# Patient Record
Sex: FEMALE | HISPANIC OR LATINO | Employment: FULL TIME | URBAN - METROPOLITAN AREA
[De-identification: names, ages, dates, MRNs, and addresses within clinical notes are randomized per-mention and may not be internally consistent; named-entity substitution may affect disease eponyms.]

---

## 2017-12-20 ENCOUNTER — HOSPITAL ENCOUNTER (OUTPATIENT)
Facility: MEDICAL CENTER | Age: 27
End: 2017-12-20
Attending: SPECIALIST | Admitting: SPECIALIST
Payer: MEDICAID

## 2017-12-21 ENCOUNTER — HOSPITAL ENCOUNTER (INPATIENT)
Facility: MEDICAL CENTER | Age: 27
LOS: 2 days | End: 2017-12-23
Attending: SPECIALIST | Admitting: SPECIALIST
Payer: MEDICAID

## 2017-12-21 DIAGNOSIS — R10.2 PELVIC PAIN: ICD-10-CM

## 2017-12-21 PROBLEM — Z34.90 PREGNANCY: Status: ACTIVE | Noted: 2017-12-21

## 2017-12-21 LAB
BASOPHILS # BLD AUTO: 0.6 % (ref 0–1.8)
BASOPHILS # BLD: 0.06 K/UL (ref 0–0.12)
EOSINOPHIL # BLD AUTO: 0.03 K/UL (ref 0–0.51)
EOSINOPHIL NFR BLD: 0.3 % (ref 0–6.9)
ERYTHROCYTE [DISTWIDTH] IN BLOOD BY AUTOMATED COUNT: 42.3 FL (ref 35.9–50)
HCT VFR BLD AUTO: 36.4 % (ref 37–47)
HGB BLD-MCNC: 11.7 G/DL (ref 12–16)
HOLDING TUBE BB 8507: NORMAL
IMM GRANULOCYTES # BLD AUTO: 0.04 K/UL (ref 0–0.11)
IMM GRANULOCYTES NFR BLD AUTO: 0.4 % (ref 0–0.9)
LYMPHOCYTES # BLD AUTO: 2.74 K/UL (ref 1–4.8)
LYMPHOCYTES NFR BLD: 26.5 % (ref 22–41)
MCH RBC QN AUTO: 26.1 PG (ref 27–33)
MCHC RBC AUTO-ENTMCNC: 32.1 G/DL (ref 33.6–35)
MCV RBC AUTO: 81.1 FL (ref 81.4–97.8)
MONOCYTES # BLD AUTO: 0.59 K/UL (ref 0–0.85)
MONOCYTES NFR BLD AUTO: 5.7 % (ref 0–13.4)
NEUTROPHILS # BLD AUTO: 6.87 K/UL (ref 2–7.15)
NEUTROPHILS NFR BLD: 66.5 % (ref 44–72)
NRBC # BLD AUTO: 0 K/UL
NRBC BLD-RTO: 0 /100 WBC
PLATELET # BLD AUTO: 280 K/UL (ref 164–446)
PMV BLD AUTO: 12.7 FL (ref 9–12.9)
RBC # BLD AUTO: 4.49 M/UL (ref 4.2–5.4)
WBC # BLD AUTO: 10.3 K/UL (ref 4.8–10.8)

## 2017-12-21 PROCEDURE — 700111 HCHG RX REV CODE 636 W/ 250 OVERRIDE (IP): Performed by: SPECIALIST

## 2017-12-21 PROCEDURE — 700111 HCHG RX REV CODE 636 W/ 250 OVERRIDE (IP)

## 2017-12-21 PROCEDURE — 36415 COLL VENOUS BLD VENIPUNCTURE: CPT

## 2017-12-21 PROCEDURE — 700101 HCHG RX REV CODE 250

## 2017-12-21 PROCEDURE — 3E0E7GC INTRODUCTION OF OTHER THERAPEUTIC SUBSTANCE INTO PRODUCTS OF CONCEPTION, VIA NATURAL OR ARTIFICIAL OPENING: ICD-10-PCS | Performed by: SPECIALIST

## 2017-12-21 PROCEDURE — 770002 HCHG ROOM/CARE - OB PRIVATE (112)

## 2017-12-21 PROCEDURE — 85025 COMPLETE CBC W/AUTO DIFF WBC: CPT

## 2017-12-21 PROCEDURE — 10907ZC DRAINAGE OF AMNIOTIC FLUID, THERAPEUTIC FROM PRODUCTS OF CONCEPTION, VIA NATURAL OR ARTIFICIAL OPENING: ICD-10-PCS | Performed by: SPECIALIST

## 2017-12-21 PROCEDURE — 700105 HCHG RX REV CODE 258: Performed by: SPECIALIST

## 2017-12-21 PROCEDURE — 3E033VJ INTRODUCTION OF OTHER HORMONE INTO PERIPHERAL VEIN, PERCUTANEOUS APPROACH: ICD-10-PCS | Performed by: SPECIALIST

## 2017-12-21 PROCEDURE — 10H07YZ INSERTION OF OTHER DEVICE INTO PRODUCTS OF CONCEPTION, VIA NATURAL OR ARTIFICIAL OPENING: ICD-10-PCS | Performed by: SPECIALIST

## 2017-12-21 PROCEDURE — 303615 HCHG EPIDURAL/SPINAL ANESTHESIA FOR LABOR

## 2017-12-21 RX ORDER — SODIUM CHLORIDE, SODIUM LACTATE, POTASSIUM CHLORIDE, CALCIUM CHLORIDE 600; 310; 30; 20 MG/100ML; MG/100ML; MG/100ML; MG/100ML
INJECTION, SOLUTION INTRAVENOUS
Status: ACTIVE
Start: 2017-12-21 | End: 2017-12-22

## 2017-12-21 RX ORDER — METHYLERGONOVINE MALEATE 0.2 MG/ML
0.2 INJECTION INTRAVENOUS
Status: DISCONTINUED | OUTPATIENT
Start: 2017-12-21 | End: 2017-12-22 | Stop reason: HOSPADM

## 2017-12-21 RX ORDER — BUPIVACAINE HYDROCHLORIDE 2.5 MG/ML
INJECTION, SOLUTION EPIDURAL; INFILTRATION; INTRACAUDAL
Status: COMPLETED
Start: 2017-12-21 | End: 2017-12-21

## 2017-12-21 RX ORDER — ENEMA 19; 7 G/133ML; G/133ML
1 ENEMA RECTAL PRN
Status: DISCONTINUED | OUTPATIENT
Start: 2017-12-21 | End: 2017-12-22 | Stop reason: HOSPADM

## 2017-12-21 RX ORDER — OXYTOCIN 10 [USP'U]/ML
10 INJECTION, SOLUTION INTRAMUSCULAR; INTRAVENOUS
Status: DISCONTINUED | OUTPATIENT
Start: 2017-12-21 | End: 2017-12-22 | Stop reason: HOSPADM

## 2017-12-21 RX ORDER — SODIUM CHLORIDE, SODIUM LACTATE, POTASSIUM CHLORIDE, CALCIUM CHLORIDE 600; 310; 30; 20 MG/100ML; MG/100ML; MG/100ML; MG/100ML
INJECTION, SOLUTION INTRAVENOUS CONTINUOUS
Status: DISPENSED | OUTPATIENT
Start: 2017-12-21 | End: 2017-12-22

## 2017-12-21 RX ORDER — ROPIVACAINE HYDROCHLORIDE 2 MG/ML
INJECTION, SOLUTION EPIDURAL; INFILTRATION; PERINEURAL
Status: COMPLETED
Start: 2017-12-21 | End: 2017-12-21

## 2017-12-21 RX ADMIN — ROPIVACAINE HYDROCHLORIDE 100 ML: 2 INJECTION, SOLUTION EPIDURAL; INFILTRATION at 20:48

## 2017-12-21 RX ADMIN — BUPIVACAINE HYDROCHLORIDE: 2.5 INJECTION, SOLUTION EPIDURAL; INFILTRATION; INTRACAUDAL; PERINEURAL at 20:30

## 2017-12-21 RX ADMIN — SODIUM CHLORIDE, POTASSIUM CHLORIDE, SODIUM LACTATE AND CALCIUM CHLORIDE: 600; 310; 30; 20 INJECTION, SOLUTION INTRAVENOUS at 19:26

## 2017-12-21 RX ADMIN — FENTANYL CITRATE 100 MCG: 50 INJECTION INTRAMUSCULAR; INTRAVENOUS at 19:25

## 2017-12-21 RX ADMIN — FENTANYL CITRATE: 50 INJECTION, SOLUTION INTRAMUSCULAR; INTRAVENOUS at 20:30

## 2017-12-21 RX ADMIN — SODIUM CHLORIDE, POTASSIUM CHLORIDE, SODIUM LACTATE AND CALCIUM CHLORIDE: 600; 310; 30; 20 INJECTION, SOLUTION INTRAVENOUS at 17:50

## 2017-12-21 RX ADMIN — Medication 2 MILLI-UNITS/MIN: at 17:58

## 2017-12-21 ASSESSMENT — LIFESTYLE VARIABLES
DO YOU DRINK ALCOHOL: NO
EVER_SMOKED: NEVER
ALCOHOL_USE: NO

## 2017-12-21 NOTE — PROGRESS NOTES
27 y.o.  EDC=  39.5     TOCO and US on.  VSS.  Pt presents to triage with c/o UCs.  Denies VB or LOF and states pos FM, but a little less.  SVE -3/50/-2.  1600-Dr. Diego here, in room to see pt.  POC-IOL, will transfer to L&D.  1700-Pt care assumed by Alondra ARREGUIN, report given.

## 2017-12-22 LAB
ERYTHROCYTE [DISTWIDTH] IN BLOOD BY AUTOMATED COUNT: 43 FL (ref 35.9–50)
HCT VFR BLD AUTO: 29.3 % (ref 37–47)
HGB BLD-MCNC: 9.4 G/DL (ref 12–16)
MCH RBC QN AUTO: 26.3 PG (ref 27–33)
MCHC RBC AUTO-ENTMCNC: 32.1 G/DL (ref 33.6–35)
MCV RBC AUTO: 81.8 FL (ref 81.4–97.8)
PLATELET # BLD AUTO: 195 K/UL (ref 164–446)
PMV BLD AUTO: 12.1 FL (ref 9–12.9)
RBC # BLD AUTO: 3.58 M/UL (ref 4.2–5.4)
WBC # BLD AUTO: 10.4 K/UL (ref 4.8–10.8)

## 2017-12-22 PROCEDURE — 90471 IMMUNIZATION ADMIN: CPT

## 2017-12-22 PROCEDURE — 3E0234Z INTRODUCTION OF SERUM, TOXOID AND VACCINE INTO MUSCLE, PERCUTANEOUS APPROACH: ICD-10-PCS | Performed by: SPECIALIST

## 2017-12-22 PROCEDURE — 90686 IIV4 VACC NO PRSV 0.5 ML IM: CPT | Performed by: SPECIALIST

## 2017-12-22 PROCEDURE — 700105 HCHG RX REV CODE 258: Performed by: SPECIALIST

## 2017-12-22 PROCEDURE — 770002 HCHG ROOM/CARE - OB PRIVATE (112)

## 2017-12-22 PROCEDURE — 85027 COMPLETE CBC AUTOMATED: CPT

## 2017-12-22 PROCEDURE — 700111 HCHG RX REV CODE 636 W/ 250 OVERRIDE (IP)

## 2017-12-22 PROCEDURE — 700102 HCHG RX REV CODE 250 W/ 637 OVERRIDE(OP): Performed by: SPECIALIST

## 2017-12-22 PROCEDURE — A9270 NON-COVERED ITEM OR SERVICE: HCPCS | Performed by: SPECIALIST

## 2017-12-22 PROCEDURE — 36415 COLL VENOUS BLD VENIPUNCTURE: CPT

## 2017-12-22 PROCEDURE — 700112 HCHG RX REV CODE 229: Performed by: SPECIALIST

## 2017-12-22 PROCEDURE — 304965 HCHG RECOVERY SERVICES

## 2017-12-22 PROCEDURE — 700111 HCHG RX REV CODE 636 W/ 250 OVERRIDE (IP): Performed by: SPECIALIST

## 2017-12-22 PROCEDURE — 90707 MMR VACCINE SC: CPT

## 2017-12-22 PROCEDURE — 59409 OBSTETRICAL CARE: CPT

## 2017-12-22 RX ORDER — IBUPROFEN 600 MG/1
600 TABLET ORAL EVERY 6 HOURS PRN
Status: DISCONTINUED | OUTPATIENT
Start: 2017-12-22 | End: 2017-12-23 | Stop reason: HOSPADM

## 2017-12-22 RX ORDER — HYDROCODONE BITARTRATE AND ACETAMINOPHEN 10; 325 MG/1; MG/1
1 TABLET ORAL EVERY 4 HOURS PRN
Status: DISCONTINUED | OUTPATIENT
Start: 2017-12-22 | End: 2017-12-23 | Stop reason: HOSPADM

## 2017-12-22 RX ORDER — ONDANSETRON 2 MG/ML
4 INJECTION INTRAMUSCULAR; INTRAVENOUS EVERY 6 HOURS PRN
Status: DISCONTINUED | OUTPATIENT
Start: 2017-12-22 | End: 2017-12-23 | Stop reason: HOSPADM

## 2017-12-22 RX ORDER — METHYLERGONOVINE MALEATE 0.2 MG/ML
0.2 INJECTION INTRAVENOUS
Status: DISCONTINUED | OUTPATIENT
Start: 2017-12-22 | End: 2017-12-23 | Stop reason: HOSPADM

## 2017-12-22 RX ORDER — HYDROCODONE BITARTRATE AND ACETAMINOPHEN 5; 325 MG/1; MG/1
1 TABLET ORAL EVERY 4 HOURS PRN
Status: DISCONTINUED | OUTPATIENT
Start: 2017-12-22 | End: 2017-12-23 | Stop reason: HOSPADM

## 2017-12-22 RX ORDER — SODIUM CHLORIDE, SODIUM LACTATE, POTASSIUM CHLORIDE, CALCIUM CHLORIDE 600; 310; 30; 20 MG/100ML; MG/100ML; MG/100ML; MG/100ML
INJECTION, SOLUTION INTRAVENOUS PRN
Status: DISCONTINUED | OUTPATIENT
Start: 2017-12-22 | End: 2017-12-23 | Stop reason: HOSPADM

## 2017-12-22 RX ORDER — ACETAMINOPHEN 325 MG/1
325 TABLET ORAL EVERY 4 HOURS PRN
Status: DISCONTINUED | OUTPATIENT
Start: 2017-12-22 | End: 2017-12-23 | Stop reason: HOSPADM

## 2017-12-22 RX ORDER — ONDANSETRON 4 MG/1
4 TABLET, ORALLY DISINTEGRATING ORAL EVERY 6 HOURS PRN
Status: DISCONTINUED | OUTPATIENT
Start: 2017-12-22 | End: 2017-12-23 | Stop reason: HOSPADM

## 2017-12-22 RX ORDER — DOCUSATE SODIUM 100 MG/1
100 CAPSULE, LIQUID FILLED ORAL 2 TIMES DAILY PRN
Status: DISCONTINUED | OUTPATIENT
Start: 2017-12-22 | End: 2017-12-23 | Stop reason: HOSPADM

## 2017-12-22 RX ADMIN — IBUPROFEN 600 MG: 600 TABLET, FILM COATED ORAL at 05:33

## 2017-12-22 RX ADMIN — DOCUSATE SODIUM 100 MG: 100 CAPSULE ORAL at 09:21

## 2017-12-22 RX ADMIN — INFLUENZA A VIRUS A/MICHIGAN/45/2015 X-275 (H1N1) ANTIGEN (FORMALDEHYDE INACTIVATED), INFLUENZA A VIRUS A/HONG KONG/4801/2014 X-263B (H3N2) ANTIGEN (FORMALDEHYDE INACTIVATED), INFLUENZA B VIRUS B/PHUKET/3073/2013 ANTIGEN (FORMALDEHYDE INACTIVATED), AND INFLUENZA B VIRUS B/BRISBANE/60/2008 ANTIGEN (FORMALDEHYDE INACTIVATED) 0.5 ML: 15; 15; 15; 15 INJECTION, SUSPENSION INTRAMUSCULAR at 14:33

## 2017-12-22 RX ADMIN — IBUPROFEN 600 MG: 600 TABLET, FILM COATED ORAL at 13:53

## 2017-12-22 RX ADMIN — HYDROCODONE BITARTRATE AND ACETAMINOPHEN 1 TABLET: 10; 325 TABLET ORAL at 13:53

## 2017-12-22 RX ADMIN — HYDROCODONE BITARTRATE AND ACETAMINOPHEN 1 TABLET: 10; 325 TABLET ORAL at 05:33

## 2017-12-22 RX ADMIN — DOCUSATE SODIUM 100 MG: 100 CAPSULE ORAL at 20:25

## 2017-12-22 RX ADMIN — Medication 125 ML/HR: at 03:45

## 2017-12-22 RX ADMIN — MEASLES, MUMPS, AND RUBELLA VIRUS VACCINE LIVE 0.5 ML: 1000; 12500; 1000 INJECTION, POWDER, LYOPHILIZED, FOR SUSPENSION SUBCUTANEOUS at 15:35

## 2017-12-22 RX ADMIN — Medication 41.67 MILLI-UNITS/MIN: at 01:08

## 2017-12-22 RX ADMIN — IBUPROFEN 600 MG: 600 TABLET, FILM COATED ORAL at 20:25

## 2017-12-22 RX ADMIN — HYDROCODONE BITARTRATE AND ACETAMINOPHEN 1 TABLET: 10; 325 TABLET ORAL at 20:27

## 2017-12-22 RX ADMIN — SODIUM CHLORIDE, POTASSIUM CHLORIDE, SODIUM LACTATE AND CALCIUM CHLORIDE: 600; 310; 30; 20 INJECTION, SOLUTION INTRAVENOUS at 01:09

## 2017-12-22 ASSESSMENT — PAIN SCALES - GENERAL
PAINLEVEL_OUTOF10: 2
PAINLEVEL_OUTOF10: 9
PAINLEVEL_OUTOF10: 0
PAINLEVEL_OUTOF10: 3
PAINLEVEL_OUTOF10: 3
PAINLEVEL_OUTOF10: 8
PAINLEVEL_OUTOF10: 0
PAINLEVEL_OUTOF10: 9

## 2017-12-22 ASSESSMENT — LIFESTYLE VARIABLES: DO YOU DRINK ALCOHOL: NO

## 2017-12-22 NOTE — CARE PLAN
Problem: Potential for postpartum infection related to presence of episiotomy/vaginal tear and/or uterine contamination  Goal: Patient will be absent from signs and symptoms of infection  Outcome: PROGRESSING AS EXPECTED  Encouraged ambulation.     Problem: Alteration in comfort related to episiotomy, vaginal repair and/or after birth pains  Goal: Patient is able to ambulate, care for self and infant  Outcome: PROGRESSING AS EXPECTED  Pain medication will be given as needed. She prefer to call.

## 2017-12-22 NOTE — DELIVERY NOTE
DATE OF SERVICE:  2017    The patient is a very pleasant 27-year-old multipara (on admission, para 4   with 4 previous vaginal deliveries) who was admitted to [facility] labor and delivery in the afternoon/evening of Thursday,   2017 and she was admitted at 39 weeks and 5 days' gestation and she was   admitted for elective induction of labor.  Her recent vaginal culture for   group B strep came back negative for group B strep.  On admission, her cervix   was found to be about 3 cm dilated and 50% effaced and -2 station.  She was   started on Pitocin.  The Pitocin was gradually increased.  Of note, the   estimated fetal weight was 3200 g.  She later received a labor epidural.  At   about 09:00 p.m. it was noted that her labor epidural was functioning well and   digital cervical exam at that time revealed that the cervix was 3-4 cm   dilated, 70% effaced and -2 station and at that time artificial rupture of   membranes was performed and clear amniotic fluid was observed and an   intrauterine pressure catheter was placed and the fetal heart tracing was   found to be category 1.  Some evidence of a fetal arrhythmia was appreciated.    Then, little bit after 10:00 fetal heart rate deceleration down to the 90s   was noted and so Pitocin was discontinued and amnioinfusion was started.  At   about 20 minutes after 11:00, the patient's cervix was found to be about 8 cm   dilated, 80% effaced, and -1 station.  A few minutes after midnight, now   2017, the patient said that she was feeling some pressure and   the nurse removed the peanut ball and found that baby's head was actually   delivered and I was called.  At 12 minutes after midnight, 2017,   the patient went on to have a normal spontaneous vaginal delivery of a live   term male  with Apgar scores of 8 and 9 at 1 and 5 minutes respectively   and a  weight of 3290 grams.  Baby was delivered over  an intact perineum   under continuous epidural anesthesia.  The placenta was simply delivered and   examined and found to be complete.  Examination of the vulva and vagina   revealed that there were no vulvar or vaginal lacerations.  A bimanual vaginal   exam was performed and revealed that the uterus was firm.  Bleeding appeared   to be minimal.    ESTIMATED BLOOD LOSS:  Approximately 200 mL.       ____________________________________     MD KALEE PATRICK / NTS    DD:  12/22/2017 07:41:10  DT:  12/22/2017 07:55:27    D#:  [id number]  Job#:  [id number]

## 2017-12-22 NOTE — PROGRESS NOTES
Kirstie Nava R.N. Lactation Consultant Signed   Progress Notes Date of Service: 12/22/2017  3:31 PM         []Hide copied text  []Ramanaver for attribution information  Met with mother, 5th baby. She nursed her previous baby for 2 years. Her plan is to breastfeed first then bottle feed. She feels that feeds and latch are going well. Offered her ongoing lactation help while in the hospital.

## 2017-12-22 NOTE — PROGRESS NOTES
Eneida is a very pleasant 27-year-old multipara R (para 4, and Eneida has had 4 previous vaginal deliveries) who is today 39 weeks and 5 days gestation. She is admitted today for induction of labor. Her recent vaginal culture for group B strep came back negative for group B strep. Her cervix on admission is 3 cm dilated and 50% effaced and -2 station. She is being started on Pitocin. The Pitocin will be gradually increased. We will give analgesia as needed. We will anticipate an obstetrical delivery. The estimated fetal weight is 3200 grams.  Anthony Diego M.D.

## 2017-12-22 NOTE — PROGRESS NOTES
The patient received a labor epidural and her labor epidural appears to be effectively relieving her pain.  Digital cervical exam reveals that the cervix is now 3-4 cm dilated and 70% effaced and -2 station and artificial rupture membranes is performed and not much fluid is observed but there is perhaps some light meconium staining of the fluid. An IUPC is then placed.  The fetal heart tracing is category 1.  We will anticipate an obstetrical delivery.  Anthony Diego M.D.

## 2017-12-22 NOTE — PROGRESS NOTES
1700- Patient transferred to labor room. Admission orders received and completed.     1758- Pitocin started per MAR.  Patient requesting epidural but anesthesia in OR. Patient educated on option of fentanyl but declines at this time.     1900- Report to noc shift RN. POC discussed.

## 2017-12-22 NOTE — CARE PLAN
Problem: Potential for postpartum infection related to presence of episiotomy/vaginal tear and/or uterine contamination  Goal: Patient will be absent from signs and symptoms of infection  Outcome: PROGRESSING AS EXPECTED  Encouraged ambulation.     Problem: Alteration in comfort related to episiotomy, vaginal repair and/or after birth pains  Goal: Patient is able to ambulate, care for self and infant  Outcome: PROGRESSING AS EXPECTED  Will medicate for pain as needed. She prefer to call if needed.

## 2017-12-22 NOTE — PROGRESS NOTES
A bedside report received from Shira ARREGUIN @ the change of shift.  Assumed care. Discussed plan of care especially on managing pain. Assessment done. Denies pain. Encouraged to call if with needs. Will check at intervals.

## 2017-12-22 NOTE — PROGRESS NOTES
-rcvd report from segundo RN and assumed care of pt.  -SVE=3/70/-2  -pt requesting epidural at this time. Fluid bolus infusing.  -Dr. Gilliland at bedside to place epidural. Placed without incidence.  -Dr. iDego at bedside to rupture membranes. AROM with light meconium noted. SVE-4/80/-1 Pt now having late decels.  -pt having prolonged decels x4 minutes down into 90's. Pit off, o2 applied and fluid bolus infusing. Dr. Diego called and updated on pt. Orders rcvd for amnioinfusion.  -SVE=8/-1  0011=pt called out to RN feeling pressure. RN immediately to room. Peanut ball removed and babies head was out and baby was crying. Dr. Diego called for delivery.   0012- of baby boy with 8/9 apgars born at 0012.   0014-placenta delivery without difficulty. No lacerations.  0245-pt up to bathroom to void but unable to at this time. Right leg still slightly numb. Barbie area cleaned and new gown applied. Pt wheeled to  via wheelchair in stable condition. Baby taken to nursery earlier for desats. Report given to Nelsy ARREGUIN

## 2017-12-22 NOTE — PROGRESS NOTES
Patient arrived to room 320 at 0315. Report received from KALLIE Goodman. Educated patient on cuddles system, infant identification, emergency pull cord, and [facility] health education videos.

## 2017-12-22 NOTE — DELIVERY NOTE
Normal spontaneous vaginal delivery.  Live term male .  Apgar scores 8 and 9 at one and 5 minutes respectively.  Weight not yet recorded but estimated to be approximately 3 kg or perhaps 3-3.5 kg.  Baby was delivered over an intact perineum under continuous epidural anesthesia.  The placenta was then simply delivered and examined and found to be complete.  Next examination of the vulva and vagina revealed that there were no vulvar or vaginal lacerations.  A bimanual vaginal exam was then performed and revealed that the uterus was firm.  Bleeding appeared to be minimal.  The estimated blood loss was approximately 200 mL.  Anthony Diego M.D.

## 2017-12-23 VITALS
DIASTOLIC BLOOD PRESSURE: 86 MMHG | WEIGHT: 246 LBS | HEART RATE: 63 BPM | RESPIRATION RATE: 18 BRPM | HEIGHT: 66 IN | OXYGEN SATURATION: 94 % | SYSTOLIC BLOOD PRESSURE: 132 MMHG | BODY MASS INDEX: 39.53 KG/M2 | TEMPERATURE: 98 F

## 2017-12-23 PROCEDURE — 700102 HCHG RX REV CODE 250 W/ 637 OVERRIDE(OP): Performed by: SPECIALIST

## 2017-12-23 PROCEDURE — A9270 NON-COVERED ITEM OR SERVICE: HCPCS | Performed by: SPECIALIST

## 2017-12-23 RX ORDER — FERROUS SULFATE 325(65) MG
325 TABLET ORAL
Status: DISCONTINUED | OUTPATIENT
Start: 2017-12-23 | End: 2017-12-23 | Stop reason: HOSPADM

## 2017-12-23 RX ORDER — IBUPROFEN 800 MG/1
800 TABLET ORAL EVERY 8 HOURS PRN
Qty: 30 TAB | Refills: 3 | Status: SHIPPED | OUTPATIENT
Start: 2017-12-23

## 2017-12-23 RX ORDER — PSEUDOEPHEDRINE HCL 30 MG
100 TABLET ORAL 2 TIMES DAILY
Qty: 30 CAP | Refills: 0 | Status: SHIPPED | OUTPATIENT
Start: 2017-12-23 | End: 2023-06-02

## 2017-12-23 RX ORDER — HYDROCODONE BITARTRATE AND ACETAMINOPHEN 5; 325 MG/1; MG/1
1 TABLET ORAL EVERY 6 HOURS PRN
Qty: 28 TAB | Refills: 0 | Status: SHIPPED | OUTPATIENT
Start: 2017-12-23 | End: 2018-01-23

## 2017-12-23 RX ORDER — FERROUS SULFATE 325(65) MG
325 TABLET ORAL
Qty: 30 TAB | Refills: 0 | Status: SHIPPED | OUTPATIENT
Start: 2017-12-23 | End: 2023-06-02

## 2017-12-23 RX ADMIN — IBUPROFEN 600 MG: 600 TABLET, FILM COATED ORAL at 05:16

## 2017-12-23 RX ADMIN — HYDROCODONE BITARTRATE AND ACETAMINOPHEN 1 TABLET: 10; 325 TABLET ORAL at 05:16

## 2017-12-23 ASSESSMENT — PAIN SCALES - GENERAL
PAINLEVEL_OUTOF10: 3
PAINLEVEL_OUTOF10: 8
PAINLEVEL_OUTOF10: 4

## 2017-12-23 NOTE — DISCHARGE INSTRUCTIONS
POSTPARTUM DISCHARGE INSTRUCTIONS FOR MOM    YOB: 1990   Age: 27 y.o.               Admit Date: 2017     Discharge Date: 2017  Attending Doctor:  Anthony Diego M.D.                  Allergies:  Oxycodone    Discharged to home by car. Discharged via wheelchair, hospital escort: Yes.  Special equipment needed: Not Applicable  Belongings with: Personal  Be sure to schedule a follow-up appointment with your primary care doctor or any specialists as instructed.     Discharge Plan:   Influenza Vaccine Indication: Indicated: 9 to 64 years of age  Influenza Vaccine Given - only chart on this line when given: Influenza Vaccine Given (See MAR)    REASONS TO CALL YOUR OBSTETRICIAN:  1.   Persistent fever or shaking chills (Temperature higher than 100.4)  2.   Heavy bleeding (soaking more than 1 pad per hour); Passing clots  3.   Foul odor from vagina  4.   Mastitis (Breast infection; breast pain, chills, fever, redness)  5.   Urinary pain, burning or frequency  6.   Episiotomy infection  7.   Abdominal incision infection  8.   Severe depression longer than 24 hours    HAND WASHING  · Prior to handling the baby.  · Before breastfeeding or bottle feeding baby.  · After using the bathroom or changing the baby's diaper.    WOUND CARE  Ask your physician for additional care instructions.  In general:    ·  Incision:      · Keep clean and dry.    · Do NOT lift anything heavier than your baby for up to 6 weeks.    · There should not be any opening or pus.      VAGINAL CARE  · Nothing inside vagina for 6 weeks: no sexual intercourse, tampons or douching.  · Bleeding may continue for 2-4 weeks.  Amount may vary.    · Call your physician for heavy bleeding which means soaking more than 1 pad per hour    BIRTH CONTROL  · It is possible to become pregnant at any time after delivery and while breastfeeding.  · Plan to discuss a method of birth control with your physician at your follow up visit.  "visit.    DIET AND ELIMINATION  · Eating more fiber (bran cereal, fruits, and vegetables) and drinking plenty of fluids will help to avoid constipation.  · Urinary frequency after childbirth is normal.    POSTPARTUM BLUES  During the first few days after birth, you may experience a sense of the \"blues\" which may include impatience, irritability or even crying.  These feeling come and go quickly.  However, as many as 1 in 10 women experience emotional symptoms known as postpartum depression.    Postpartum depression:  May start as early as the second or third day after delivery or take several weeks or months to develop.  Symptoms of \"blues\" are present, but are more intense:  Crying spells; loss of appetite; feelings of hopelessness or loss of control; fear of touching the baby; over concern or no concern at all about the baby; little or no concern about your own appearance/caring for yourself; and/or inability to sleep or excessive sleeping.  Contact your physician if you are experiencing any of these symptoms.    Crisis Hotline:  · [place] Crisis Hotline:  1-444-BIMLHWJ  Or 1-228.665.9265  · [place] Crisis Hotline:  1-196.350.2054  Or 230-258-8887    PREVENTING SHAKEN BABY:  If you are angry or stressed, PUT THE BABY IN THE CRIB, step away, take some deep breaths, and wait until you are calm to care for the baby.  DO NOT SHAKE THE BABY.  You are not alone, call a supporter for help.    · Crisis Call Center 24/7 crisis line 133-892-0957 or 1-398.387.4840  · You can also text them, text \"ANSWER\" to 630748    QUIT SMOKING/TOBACCO USE:  I understand the use of any tobacco products increases my chance of suffering from future heart disease and could cause other illnesses which may shorten my life. Quitting the use of tobacco products is the single most important thing I can do to improve my health. For further information on smoking / tobacco cessation call a Toll Free Quit Line at 1-605.707.6327 (*National Cancer " [place]) or 1-585.735.1161 (American Lung Association) or you can access the web based program at www.lungusa.org.    · [place] Tobacco Users Help Line:  (948) 241-3541       Toll Free: 1-634.344.9874  · Quit Tobacco Program [facility] (355)208-4868    DEPRESSION / SUICIDE RISK:  As you are discharged from this [facility], it is important to learn how to keep safe from harming yourself.    Recognize the warning signs:  · Abrupt changes in personality, positive or negative- including increase in energy   · Giving away possessions  · Change in eating patterns- significant weight changes-  positive or negative  · Change in sleeping patterns- unable to sleep or sleeping all the time   · Unwillingness or inability to communicate  · Depression  · Unusual sadness, discouragement and loneliness  · Talk of wanting to die  · Neglect of personal appearance   · Rebelliousness- reckless behavior  · Withdrawal from people/activities they love  · Confusion- inability to concentrate     If you or a loved one observes any of these behaviors or has concerns about self-harm, here's what you can do:  · Talk about it- your feelings and reasons for harming yourself  · Remove any means that you might use to hurt yourself (examples: pills, rope, extension cords, firearm)  · Get professional help from the community (Mental Health, Substance Abuse, psychological counseling)  · Do not be alone:Call your Safe Contact- someone whom you trust who will be there for you.  · Call your local CRISIS HOTLINE 023-3503 or 288-682-2668  · Call your local Children's Mobile Crisis Response Team [place] (108) 686-8619 or www.[employer]  · Call the toll free National Suicide Prevention Hotlines   · National Suicide Prevention Lifeline 782-619-OILW (8073)  · National Hope Line Network 800-SUICIDE (273-6932)    DISCHARGE SURVEY:  Thank you for choosing [facility].  We hope we provided you with very good care.   You may be receiving a survey in the mail.  Please fill it out.  Your opinion is valuable to us.    ADDITIONAL EDUCATIONAL MATERIALS GIVEN TO PATIENT:        My signature on this form indicates that:  1.  I have reviewed and understand the above information  2.  My questions regarding this information have been answered to my satisfaction.  3.  I have formulated a plan with my discharge nurse to obtain my prescribed medication for home.

## 2017-12-23 NOTE — PROGRESS NOTES
Received report from ROXANE Quinteros RN. Assessment complete. Pt states pain at acceptable level. Discussed pain management plan with pt. Pt will call for PRN pain meds. Pt educated on the dangers of sleeping with infant. Call light within reach. Pt encouraged to call with needs or concerns.

## 2017-12-23 NOTE — PROGRESS NOTES
POST PARTUM DAY # 1  The patient complains of cramping pain.   She says that she feels fine other wise and she says that she has no other problems or complaints.   She says that she would like to go home today.   The patient's vital signs are stable and she is afebrile.   She appears well developed and well nourished and relaxed and alert and comfortable and in no apparent distress.   Labs: the patient's hemoglobin went from 11.7 grams per deciliter antepartum to 9.4 grams per deciliter post partum.   We will plan on discharge home today.   Follow up in office in about 6 weeks for post partum visit.   Anthony Diego M.D.

## 2018-06-07 ENCOUNTER — HOSPITAL ENCOUNTER (EMERGENCY)
Facility: MEDICAL CENTER | Age: 28
End: 2018-06-08
Attending: EMERGENCY MEDICINE
Payer: MEDICAID

## 2018-06-07 ENCOUNTER — APPOINTMENT (OUTPATIENT)
Dept: RADIOLOGY | Facility: MEDICAL CENTER | Age: 28
End: 2018-06-07
Attending: EMERGENCY MEDICINE
Payer: MEDICAID

## 2018-06-07 DIAGNOSIS — R10.11 RUQ ABDOMINAL PAIN: ICD-10-CM

## 2018-06-07 DIAGNOSIS — R11.0 NAUSEA: ICD-10-CM

## 2018-06-07 LAB
ALBUMIN SERPL BCP-MCNC: 4 G/DL (ref 3.2–4.9)
ALBUMIN/GLOB SERPL: 1.3 G/DL
ALP SERPL-CCNC: 54 U/L (ref 30–99)
ALT SERPL-CCNC: 18 U/L (ref 2–50)
ANION GAP SERPL CALC-SCNC: 7 MMOL/L (ref 0–11.9)
APPEARANCE UR: CLEAR
AST SERPL-CCNC: 16 U/L (ref 12–45)
BASOPHILS # BLD AUTO: 0.8 % (ref 0–1.8)
BASOPHILS # BLD: 0.08 K/UL (ref 0–0.12)
BILIRUB SERPL-MCNC: 0.4 MG/DL (ref 0.1–1.5)
BILIRUB UR QL STRIP.AUTO: NEGATIVE
BUN SERPL-MCNC: 7 MG/DL (ref 8–22)
CALCIUM SERPL-MCNC: 9.1 MG/DL (ref 8.4–10.2)
CHLORIDE SERPL-SCNC: 106 MMOL/L (ref 96–112)
CO2 SERPL-SCNC: 24 MMOL/L (ref 20–33)
COLOR UR: YELLOW
CREAT SERPL-MCNC: 0.58 MG/DL (ref 0.5–1.4)
EOSINOPHIL # BLD AUTO: 0.21 K/UL (ref 0–0.51)
EOSINOPHIL NFR BLD: 2.1 % (ref 0–6.9)
EPI CELLS #/AREA URNS HPF: NORMAL /HPF
ERYTHROCYTE [DISTWIDTH] IN BLOOD BY AUTOMATED COUNT: 42.5 FL (ref 35.9–50)
GLOBULIN SER CALC-MCNC: 3.2 G/DL (ref 1.9–3.5)
GLUCOSE SERPL-MCNC: 94 MG/DL (ref 65–99)
GLUCOSE UR STRIP.AUTO-MCNC: NEGATIVE MG/DL
HCG UR QL: NEGATIVE
HCT VFR BLD AUTO: 40.1 % (ref 37–47)
HGB BLD-MCNC: 12.4 G/DL (ref 12–16)
IMM GRANULOCYTES # BLD AUTO: 0.02 K/UL (ref 0–0.11)
IMM GRANULOCYTES NFR BLD AUTO: 0.2 % (ref 0–0.9)
KETONES UR STRIP.AUTO-MCNC: 15 MG/DL
LEUKOCYTE ESTERASE UR QL STRIP.AUTO: NEGATIVE
LIPASE SERPL-CCNC: 23 U/L (ref 7–58)
LYMPHOCYTES # BLD AUTO: 3.15 K/UL (ref 1–4.8)
LYMPHOCYTES NFR BLD: 31.2 % (ref 22–41)
MCH RBC QN AUTO: 24.8 PG (ref 27–33)
MCHC RBC AUTO-ENTMCNC: 30.9 G/DL (ref 33.6–35)
MCV RBC AUTO: 80.4 FL (ref 81.4–97.8)
MICRO URNS: ABNORMAL
MONOCYTES # BLD AUTO: 0.55 K/UL (ref 0–0.85)
MONOCYTES NFR BLD AUTO: 5.4 % (ref 0–13.4)
MUCOUS THREADS #/AREA URNS HPF: NORMAL /HPF
NEUTROPHILS # BLD AUTO: 6.09 K/UL (ref 2–7.15)
NEUTROPHILS NFR BLD: 60.3 % (ref 44–72)
NITRITE UR QL STRIP.AUTO: NEGATIVE
NRBC # BLD AUTO: 0 K/UL
NRBC BLD-RTO: 0 /100 WBC
PH UR STRIP.AUTO: 5 [PH]
PLATELET # BLD AUTO: 409 K/UL (ref 164–446)
PMV BLD AUTO: 10.4 FL (ref 9–12.9)
POTASSIUM SERPL-SCNC: 3.5 MMOL/L (ref 3.6–5.5)
PROT SERPL-MCNC: 7.2 G/DL (ref 6–8.2)
PROT UR QL STRIP: NEGATIVE MG/DL
RBC # BLD AUTO: 4.99 M/UL (ref 4.2–5.4)
RBC # URNS HPF: NORMAL /HPF
RBC UR QL AUTO: ABNORMAL
SODIUM SERPL-SCNC: 137 MMOL/L (ref 135–145)
SP GR UR REFRACTOMETRY: 1.02
WBC # BLD AUTO: 10.1 K/UL (ref 4.8–10.8)
WBC #/AREA URNS HPF: NORMAL /HPF

## 2018-06-07 PROCEDURE — 80053 COMPREHEN METABOLIC PANEL: CPT

## 2018-06-07 PROCEDURE — A9270 NON-COVERED ITEM OR SERVICE: HCPCS | Performed by: EMERGENCY MEDICINE

## 2018-06-07 PROCEDURE — 96375 TX/PRO/DX INJ NEW DRUG ADDON: CPT

## 2018-06-07 PROCEDURE — 700111 HCHG RX REV CODE 636 W/ 250 OVERRIDE (IP): Performed by: EMERGENCY MEDICINE

## 2018-06-07 PROCEDURE — 83690 ASSAY OF LIPASE: CPT

## 2018-06-07 PROCEDURE — 700102 HCHG RX REV CODE 250 W/ 637 OVERRIDE(OP): Performed by: EMERGENCY MEDICINE

## 2018-06-07 PROCEDURE — 36415 COLL VENOUS BLD VENIPUNCTURE: CPT

## 2018-06-07 PROCEDURE — 85025 COMPLETE CBC W/AUTO DIFF WBC: CPT

## 2018-06-07 PROCEDURE — 81001 URINALYSIS AUTO W/SCOPE: CPT

## 2018-06-07 PROCEDURE — 96374 THER/PROPH/DIAG INJ IV PUSH: CPT

## 2018-06-07 PROCEDURE — 99285 EMERGENCY DEPT VISIT HI MDM: CPT

## 2018-06-07 PROCEDURE — 81025 URINE PREGNANCY TEST: CPT

## 2018-06-07 PROCEDURE — 76705 ECHO EXAM OF ABDOMEN: CPT

## 2018-06-07 RX ORDER — METOCLOPRAMIDE HYDROCHLORIDE 5 MG/ML
10 INJECTION INTRAMUSCULAR; INTRAVENOUS ONCE
Status: COMPLETED | OUTPATIENT
Start: 2018-06-07 | End: 2018-06-07

## 2018-06-07 RX ORDER — KETOROLAC TROMETHAMINE 30 MG/ML
15 INJECTION, SOLUTION INTRAMUSCULAR; INTRAVENOUS ONCE
Status: COMPLETED | OUTPATIENT
Start: 2018-06-07 | End: 2018-06-07

## 2018-06-07 RX ORDER — ONDANSETRON 2 MG/ML
4 INJECTION INTRAMUSCULAR; INTRAVENOUS ONCE
Status: COMPLETED | OUTPATIENT
Start: 2018-06-07 | End: 2018-06-07

## 2018-06-07 RX ORDER — PANTOPRAZOLE SODIUM 20 MG/1
20 TABLET, DELAYED RELEASE ORAL 2 TIMES DAILY
Qty: 20 TAB | Refills: 0 | Status: SHIPPED | OUTPATIENT
Start: 2018-06-07 | End: 2018-06-17

## 2018-06-07 RX ADMIN — KETOROLAC TROMETHAMINE 15 MG: 30 INJECTION, SOLUTION INTRAMUSCULAR at 21:50

## 2018-06-07 RX ADMIN — LIDOCAINE HYDROCHLORIDE 30 ML: 20 SOLUTION OROPHARYNGEAL at 23:09

## 2018-06-07 RX ADMIN — ONDANSETRON 4 MG: 2 INJECTION INTRAMUSCULAR; INTRAVENOUS at 21:49

## 2018-06-07 RX ADMIN — METOCLOPRAMIDE 10 MG: 5 INJECTION, SOLUTION INTRAMUSCULAR; INTRAVENOUS at 23:09

## 2018-06-07 ASSESSMENT — PAIN SCALES - GENERAL
PAINLEVEL_OUTOF10: 6
PAINLEVEL_OUTOF10: 6

## 2018-06-08 VITALS
RESPIRATION RATE: 18 BRPM | HEIGHT: 66 IN | BODY MASS INDEX: 39.36 KG/M2 | HEART RATE: 72 BPM | TEMPERATURE: 98.4 F | OXYGEN SATURATION: 98 % | DIASTOLIC BLOOD PRESSURE: 53 MMHG | WEIGHT: 244.93 LBS | SYSTOLIC BLOOD PRESSURE: 149 MMHG

## 2018-06-08 NOTE — ED PROVIDER NOTES
ED Provider Note    CHIEF COMPLAINT  Chief Complaint   Patient presents with   • Abdominal Pain     Intermittent for past 2 weeks, RUQ. Pt does not have gallbladder. Reports loss of appetite, nausea, and pain that radiates to right flank. Denies fevers or dysuria.        HPI  Eneida MorenooyaEkaterinaFeliberto is a 27 y.o. female who presents complaining of intermittent right upper quadrant pain and nausea for the last 2-3 weeks. It seems to radiate towards the back when he gets worse. She states sharp. She has not actually had any vomiting. Sometimes she has loose stools, but she denies diarrhea. She has felt warm, but she has had no documented fevers. She is to years status post cholecystectomy and has also had an appendectomy. She states the pain that she is having today is above where her gallbladder pain was. She's not had any coughing, dysuria, hematuria.    REVIEW OF SYSTEMS  See HPI for further details. No coughing, dysuria All other systems are negative.    PAST MEDICAL HISTORY  Past Medical History:   Diagnosis Date   • Breath shortness 2/11/14    prescribed inhaled, has not used inhaler past 4-5months   • Fetal abnormality in pregnancy 8/31/2010   • Heart burn    • Indigestion        FAMILY HISTORY  Family History   Problem Relation Age of Onset   • Diabetes Maternal Grandmother    • Diabetes Maternal Grandfather    • Diabetes Paternal Grandmother      had bilat BKA   • Stroke         SOCIAL HISTORY  Social History     Social History   • Marital status: Single     Spouse name: N/A   • Number of children: N/A   • Years of education: N/A     Social History Main Topics   • Smoking status: Never Smoker   • Smokeless tobacco: Never Used   • Alcohol use No      Comment: 3 per month   • Drug use: No   • Sexual activity: Not Currently     Partners: Male     Other Topics Concern   • Not on file     Social History Narrative   • No narrative on file       SURGICAL HISTORY  Past Surgical History:   Procedure Laterality  "Date   • TERI BY LAPAROSCOPY  2/12/2014    Performed by Primo Glass M.D. at SURGERY [facility] ORS   • APPENDECTOMY LAPAROSCOPIC  2/18/2012    Performed by DAMEON ARAUZ at SURGERY [facility] ORS   • VAGINAL VAULT ENTEROCELE LAPAROSCOPY  2008    vaginal recon       CURRENT MEDICATIONS  None      ALLERGIES  Allergies   Allergen Reactions   • Oxycodone Palpitations       PHYSICAL EXAM  VITAL SIGNS: /53   Pulse 66   Temp 36.8 °C (98.3 °F)   Resp 18   Ht 1.676 m (5' 6\")   Wt 111.1 kg (244 lb 14.9 oz)   LMP 06/05/2018 (Exact Date)   SpO2 98%   BMI 39.53 kg/m²  @BOB[[id number]::@   Constitutional: Well developed, obese, No acute respiratory distress, Non-toxic appearance.   HENT: Normocephalic, Atraumatic, Bilateral external ears normal, Oropharynx clear, mucous membranes are moist.  Eyes: PERRLA, EOMI, Conjunctiva normal, No discharge. No icterus.  Neck: Normal range of motion. Supple, No stridor.   Lymphatic: No cervical lymphadenopathy noted.   Cardiovascular: Normal heart rate, Normal rhythm, No murmurs, No rubs, No gallops.   Thorax & Lungs: Clear to auscultation bilaterally, No respiratory distress, No wheezing.  Abdomen: Nondistended, normoactive bowel sounds, moderately tender to very deep right upper quadrant palpation. No cervical motion tenderness bilaterally. No rebound or guarding  Skin: Warm, Dry, No erythema, No rash.   Extremities: Intact distal pulses, No edema, No tenderness  Musculoskeletal: Good range of motion in all major joints. No tenderness to palpation or major deformities noted. Normal gait.  Neurologic: Alert & oriented x 3, cranial nerve and cerebellar exams grossly normal. No focal deficits noted.   Psychiatric: Affect normal, Judgment normal, Mood normal.       RADIOLOGY/PROCEDURES  US-LIVER AND BILIARY TREE   Final Result      Unremarkable findings, status post cholecystectomy.               COURSE & MEDICAL DECISION MAKING  Pertinent Labs & Imaging studies reviewed. " (See chart for details)  I reviewed the operative report from 2/12/14 by Dr. Primo Glass which revealed cholelithiasis to me due to cholelithiasis.  IV Toradol and Zofran for her symptoms.    Results for orders placed or performed during the hospital encounter of 06/07/18   CBC WITH DIFFERENTIAL   Result Value Ref Range    WBC 10.1 4.8 - 10.8 K/uL    RBC 4.99 4.20 - 5.40 M/uL    Hemoglobin 12.4 12.0 - 16.0 g/dL    Hematocrit 40.1 37.0 - 47.0 %    MCV 80.4 (L) 81.4 - 97.8 fL    MCH 24.8 (L) 27.0 - 33.0 pg    MCHC 30.9 (L) 33.6 - 35.0 g/dL    RDW 42.5 35.9 - 50.0 fL    Platelet Count 409 164 - 446 K/uL    MPV 10.4 9.0 - 12.9 fL    Neutrophils-Polys 60.30 44.00 - 72.00 %    Lymphocytes 31.20 22.00 - 41.00 %    Monocytes 5.40 0.00 - 13.40 %    Eosinophils 2.10 0.00 - 6.90 %    Basophils 0.80 0.00 - 1.80 %    Immature Granulocytes 0.20 0.00 - 0.90 %    Nucleated RBC 0.00 /100 WBC    Neutrophils (Absolute) 6.09 2.00 - 7.15 K/uL    Lymphs (Absolute) 3.15 1.00 - 4.80 K/uL    Monos (Absolute) 0.55 0.00 - 0.85 K/uL    Eos (Absolute) 0.21 0.00 - 0.51 K/uL    Baso (Absolute) 0.08 0.00 - 0.12 K/uL    Immature Granulocytes (abs) 0.02 0.00 - 0.11 K/uL    NRBC (Absolute) 0.00 K/uL   COMP METABOLIC PANEL   Result Value Ref Range    Sodium 137 135 - 145 mmol/L    Potassium 3.5 (L) 3.6 - 5.5 mmol/L    Chloride 106 96 - 112 mmol/L    Co2 24 20 - 33 mmol/L    Anion Gap 7.0 0.0 - 11.9    Glucose 94 65 - 99 mg/dL    Bun 7 (L) 8 - 22 mg/dL    Creatinine 0.58 0.50 - 1.40 mg/dL    Calcium 9.1 8.4 - 10.2 mg/dL    AST(SGOT) 16 12 - 45 U/L    ALT(SGPT) 18 2 - 50 U/L    Alkaline Phosphatase 54 30 - 99 U/L    Total Bilirubin 0.4 0.1 - 1.5 mg/dL    Albumin 4.0 3.2 - 4.9 g/dL    Total Protein 7.2 6.0 - 8.2 g/dL    Globulin 3.2 1.9 - 3.5 g/dL    A-G Ratio 1.3 g/dL   LIPASE   Result Value Ref Range    Lipase 23 7 - 58 U/L   BETA-HCG QUALITATIVE URINE   Result Value Ref Range    Beta-Hcg Urine Negative Negative   REFRACTOMETER SG   Result Value Ref  "Range    Specific Gravity 1.017    ESTIMATED GFR   Result Value Ref Range    GFR If African American >60 >60 mL/min/1.73 m 2    GFR If Non African American >60 >60 mL/min/1.73 m 2   URINALYSIS,CULTURE IF INDICATED   Result Value Ref Range    Micro Urine Req Microscopic     Color Yellow     Character Clear     Ph 5.0 5.0 - 8.0    Glucose Negative Negative mg/dL    Ketones 15 (A) Negative mg/dL    Protein Negative Negative mg/dL    Bilirubin Negative Negative    Nitrite Negative Negative    Leukocyte Esterase Negative Negative    Occult Blood Large (A) Negative   URINE MICROSCOPIC (W/UA)   Result Value Ref Range    WBC 0-2 /hpf    RBC 0-2 /hpf    Epithelial Cells Few Few /hpf    Mucous Threads Few /hpf      11:06 PM patient states that initially she felt better after being medicated, then she threw up. She states now she has a burning pain in her epigastrium. She is given IV Reglan and a GI cocktail for her symptoms.    11:47 PM reevaluation of the patient bedside. Patient is feeling significantly better and resting easily. She wants to go home and rest. She states it feels like acid. She understands that she needs to change her diet that she doesn't have a gallbladder.     The patient will return for new or worsening symptoms and is stable at the time of discharge. /53   Pulse 63   Temp 36.9 °C (98.4 °F)   Resp 18   Ht 1.676 m (5' 6\")   Wt 111.1 kg (244 lb 14.9 oz)   LMP 06/05/2018 (Exact Date)   SpO2 100%   BMI 39.53 kg/m²     The patient is referred to a primary physician for blood pressure management, diabetic screening, and for all other preventative health concerns.      DISPOSITION:  Patient will be discharged home in stable condition.    FOLLOW UP:  Adelina Palomares, P.A.  [address] [address] [phone]    Call in 1 day  for re-check next week      OUTPATIENT MEDICATIONS:  New Prescriptions    PANTOPRAZOLE (PROTONIX) 20 MG TABLET    Take 1 Tab by mouth 2 times a day " for 10 days.         FINAL IMPRESSION  1. RUQ abdominal pain    2. Nausea               Electronically signed by: Carolina Vang, 6/7/2018 9:32 PM

## 2018-06-08 NOTE — DISCHARGE INSTRUCTIONS
Low-Fat Diet for Pancreatitis or Gallbladder Conditions  A low-fat diet can be helpful if you have pancreatitis or a gallbladder condition. With these conditions, your pancreas and gallbladder have trouble digesting fats. A healthy eating plan with less fat will help rest your pancreas and gallbladder and reduce your symptoms.  What do I need to know about this diet?  · Eat a low-fat diet.  ¨ Reduce your fat intake to less than 20-30% of your total daily calories. This is less than 50-60 g of fat per day.  ¨ Remember that you need some fat in your diet. Ask your dietician what your daily goal should be.  ¨ Choose nonfat and low-fat healthy foods. Look for the words “nonfat,” “low fat,” or “fat free.”  ¨ As a guide, look on the label and choose foods with less than 3 g of fat per serving. Eat only one serving.  · Avoid alcohol.  · Do not smoke. If you need help quitting, talk with your health care provider.  · Eat small frequent meals instead of three large heavy meals.  What foods can I eat?  Grains   Include healthy grains and starches such as potatoes, wheat bread, fiber-rich cereal, and brown rice. Choose whole grain options whenever possible. In adults, whole grains should account for 45-65% of your daily calories.  Fruits and Vegetables   Eat plenty of fruits and vegetables. Fresh fruits and vegetables add fiber to your diet.  Meats and Other Protein Sources   Eat lean meat such as chicken and pork. Trim any fat off of meat before cooking it. Eggs, fish, and beans are other sources of protein. In adults, these foods should account for 10-35% of your daily calories.  Dairy   Choose low-fat milk and dairy options. Dairy includes fat and protein, as well as calcium.  Fats and Oils   Limit high-fat foods such as fried foods, sweets, baked goods, sugary drinks.  Other   Creamy sauces and condiments, such as mayonnaise, can add extra fat. Think about whether or not you need to use them, or use smaller amounts or low  fat options.  What foods are not recommended?  · High fat foods, such as:  ¨ Baked goods.  ¨ Ice cream.  ¨ Khmer toast.  ¨ Sweet rolls.  ¨ Pizza.  ¨ Cheese bread.  ¨ Foods covered with batter, butter, creamy sauces, or cheese.  ¨ Fried foods.  ¨ Sugary drinks and desserts.  · Foods that cause gas or bloating  This information is not intended to replace advice given to you by your health care provider. Make sure you discuss any questions you have with your health care provider.  Document Released: 12/23/2014 Document Revised: 05/25/2017 Document Reviewed: 12/01/2014  Elsevier Interactive Patient Education © 2017 [employer]

## 2018-06-08 NOTE — ED NOTES
"Chief Complaint   Patient presents with   • Abdominal Pain     Intermittent for past 2 weeks, RUQ. Pt does not have gallbladder. Reports loss of appetite, nausea, and pain that radiates to right flank. Denies fevers or dysuria.      /53   Pulse 66   Temp 36.8 °C (98.3 °F)   Resp 18   Ht 1.676 m (5' 6\")   Wt 111.1 kg (244 lb 14.9 oz)   SpO2 98%   BMI 39.53 kg/m²     "

## 2018-06-08 NOTE — ED NOTES
Pt discharged with instructions and 1 script.  Pt advise to follow with VIRAJ Castro tomorrow.  Pt verbalized understanding of discharge instructions.  Pt ambulated out of ED alone.

## 2019-10-02 ENCOUNTER — HOSPITAL ENCOUNTER (EMERGENCY)
Facility: MEDICAL CENTER | Age: 29
End: 2019-10-02
Attending: EMERGENCY MEDICINE
Payer: MEDICAID

## 2019-10-02 VITALS
RESPIRATION RATE: 16 BRPM | WEIGHT: 248.9 LBS | BODY MASS INDEX: 40 KG/M2 | SYSTOLIC BLOOD PRESSURE: 104 MMHG | HEIGHT: 66 IN | DIASTOLIC BLOOD PRESSURE: 66 MMHG | HEART RATE: 71 BPM | TEMPERATURE: 98 F | OXYGEN SATURATION: 97 %

## 2019-10-02 DIAGNOSIS — N30.00 ACUTE CYSTITIS WITHOUT HEMATURIA: ICD-10-CM

## 2019-10-02 LAB
APPEARANCE UR: CLEAR
BACTERIA #/AREA URNS HPF: ABNORMAL /HPF
BILIRUB UR QL STRIP.AUTO: NEGATIVE
COLOR UR: YELLOW
EPI CELLS #/AREA URNS HPF: NEGATIVE /HPF
GLUCOSE UR STRIP.AUTO-MCNC: NEGATIVE MG/DL
HCG UR QL: NEGATIVE
HYALINE CASTS #/AREA URNS LPF: ABNORMAL /LPF
KETONES UR STRIP.AUTO-MCNC: NEGATIVE MG/DL
LEUKOCYTE ESTERASE UR QL STRIP.AUTO: ABNORMAL
MICRO URNS: ABNORMAL
NITRITE UR QL STRIP.AUTO: NEGATIVE
PH UR STRIP.AUTO: 5 [PH] (ref 5–8)
PROT UR QL STRIP: NEGATIVE MG/DL
RBC # URNS HPF: ABNORMAL /HPF
RBC UR QL AUTO: NEGATIVE
SP GR UR STRIP.AUTO: 1.02
UROBILINOGEN UR STRIP.AUTO-MCNC: 0.2 MG/DL
WBC #/AREA URNS HPF: ABNORMAL /HPF

## 2019-10-02 PROCEDURE — 99284 EMERGENCY DEPT VISIT MOD MDM: CPT

## 2019-10-02 PROCEDURE — 81001 URINALYSIS AUTO W/SCOPE: CPT

## 2019-10-02 PROCEDURE — 87086 URINE CULTURE/COLONY COUNT: CPT

## 2019-10-02 PROCEDURE — 87186 SC STD MICRODIL/AGAR DIL: CPT

## 2019-10-02 PROCEDURE — 81025 URINE PREGNANCY TEST: CPT

## 2019-10-02 PROCEDURE — 87077 CULTURE AEROBIC IDENTIFY: CPT

## 2019-10-02 RX ORDER — CEPHALEXIN 500 MG/1
500 CAPSULE ORAL 3 TIMES DAILY
Qty: 21 CAP | Refills: 0 | Status: SHIPPED | OUTPATIENT
Start: 2019-10-02 | End: 2019-10-09

## 2019-10-02 NOTE — LETTER
10/6/2019               Eneida San  [address] [facility] [address]        Dear Eneida (MR#[id number])    This letter is sent in regards to your, recent visit to the [facility] Emergency Department on 10/2/2019.  During the visit, tests were performed to assist the physician in a medical diagnosis.  A review of those tests requires that we notify you of the following:    Your urine culture was POSITIVE for a bacteria called Escherichia coli. The antibiotic prescribed for you (cephalexin) should be active to treat this bacteria. IT IS IMPORTANT THAT YOU CONTINUE TAKING YOUR ANTIBIOTIC UNTIL IT IS FINISHED.      Please feel free to contact me at the number below if you have any questions or concerns. Thank you for your cooperation in the matter.    Sincerely,  ED Culture Follow-Up Staff  Laura Dunham, RicardoD    [facility], Emergency Department  [address] [place] [id number] [phone]

## 2019-10-03 NOTE — ED TRIAGE NOTES
"Chief Complaint   Patient presents with   • Painful Urination   • Headache     Patient ambulatory to triage with above complaint. States she saw her OBGYN today and reports that she does not have a UTI and had a normal pelvic exam. States she did have some SDT tests but the results will not be back for 1 week. Patient still is having painful urination.     Blood Pressure: 128/77, Pulse: 89, Respiration: 16, Temperature: 36.8 °C (98.2 °F), Height: 167.6 cm (5' 6\"), Weight: 112.9 kg (248 lb 14.4 oz), BMI (Calculated): 40.17, BSA (Calculated): 2.3, Pulse Oximetry: 98 %      "

## 2019-10-03 NOTE — ED PROVIDER NOTES
"ED Provider Note    Scribed for Darrion Hanley D.O. by Elias Boles. 10/2/2019  7:41 PM    Primary care provider: DANII Graham  Means of arrival: Walk In  History obtained from: Patient  History limited by: None    CHIEF COMPLAINT  Chief Complaint   Patient presents with   • Painful Urination   • Headache       HPI  Eneida San is a 29 y.o. female who presents to the Emergency Department for dysuria and headaches onset 4 days ago. Patient reports that she presented to her PCP 2 days ago for these symptoms who performed a urinalysis and a HPV test. She states that her urinalysis was negative, but her HPV results will not return for one week. At presentation, she notes that she is still having dysuria so she is concerned. She also reports of \"lumps\" over the exterior side of her labia. The patient notes that her PCP stated that these lumps were insignificant and was not worried about them. She denies any hematuria at this time.    REVIEW OF SYSTEMS  Pertinent positives include: dysuria and headaches  Pertinent negatives include: no hematuria  See HPI for further details.       ALLERGIES  Allergies   Allergen Reactions   • Oxycodone Palpitations       CURRENT MEDICATIONS  Home Medications     Reviewed by Adina Joseph R.N. (Registered Nurse) on 10/02/19 at 1814  Med List Status: Complete   Medication Last Dose Status   docusate sodium 100 MG Cap not taking Active   ferrous sulfate 325 (65 Fe) MG tablet not taking Active   ibuprofen (MOTRIN) 800 MG Tab not taking Active   ondansetron (ZOFRAN ODT) 4 MG TBDP not taking Active   oxycodone-acetaminophen (PERCOCET) 5-325 MG TABS not taking Active   Prenatal Vit-Fe Fumarate-FA (PRENATAL PO) not taking Active                PAST MEDICAL HISTORY   has a past medical history of Breath shortness (2/11/14), Fetal abnormality in pregnancy (8/31/2010), Heart burn, and Indigestion.    SURGICAL HISTORY   has a past surgical history that includes vaginal " "vault enterocele laparoscopy (2008); appendectomy laparoscopic (2/18/2012); and lucia by laparoscopy (2/12/2014).    SOCIAL HISTORY  Social History     Tobacco Use   • Smoking status: Never Smoker   • Smokeless tobacco: Never Used   Substance Use Topics   • Alcohol use: No     Comment: 3 per month   • Drug use: No      Social History     Substance and Sexual Activity   Drug Use No       FAMILY HISTORY  Family History   Problem Relation Age of Onset   • Diabetes Maternal Grandmother    • Diabetes Maternal Grandfather    • Diabetes Paternal Grandmother         had bilat BKA   • Stroke Unknown          PHYSICAL EXAM  VITAL SIGNS: /77   Pulse 89   Temp 36.8 °C (98.2 °F) (Temporal)   Resp 16   Ht 1.676 m (5' 6\")   Wt 112.9 kg (248 lb 14.4 oz)   SpO2 98%   BMI 40.17 kg/m²   Constitutional: Well-nourished, Well developed. In mild distress.   Head: Normocephalic, Atraumatic  Eyes: PERRL, sclera anicteric, EOMI, no lid swelling  ENT: No nasal discharge or epistaxis. No facial deformity. Mucous membranes are moist.   Lungs: No respiratory distress.  Psychiatric: Cooperative. Appropriate mood and affect.     DIAGNOSTIC STUDIES / PROCEDURES    Results for orders placed or performed during the hospital encounter of 10/02/19   URINALYSIS CULTURE, IF INDICATED   Result Value Ref Range    Color Yellow     Character Clear     Specific Gravity 1.021 <1.035    Ph 5.0 5.0 - 8.0    Glucose Negative Negative mg/dL    Ketones Negative Negative mg/dL    Protein Negative Negative mg/dL    Bilirubin Negative Negative    Urobilinogen, Urine 0.2 Negative    Nitrite Negative Negative    Leukocyte Esterase Moderate (A) Negative    Occult Blood Negative Negative    Micro Urine Req Microscopic    HCG QUALITATIVE UR   Result Value Ref Range    Beta-Hcg Urine Negative Negative   URINE MICROSCOPIC (W/UA)   Result Value Ref Range    WBC Packed (A) /hpf    RBC 0-2 /hpf    Bacteria Few (A) None /hpf    Epithelial Cells Negative /hpf    " "Hyaline Cast 0-2 /lpf     All labs reviewed by me.      COURSE & MEDICAL DECISION MAKING:  Nursing notes, VS, PMSFHx reviewed in chart.     6:20 PM - Ordered Urine Microscopic (w/ UA), Urinalysis Culture, HCG Qual UR, Refractometer SG, and Urine Culture to evaluate.    7:41 PM - Patient seen and examined at bedside. Discussed with the patient that it appears she has a urinary tract infection given her urine sample. I have conveyed to the patient that I will prescribe her Keflex in order to treat this. She understands proper administration and dosage of this medication. I have instructed the patient to follow up with her PCP. She is safe for discharge. Patient is agreeable and understanding to discharge.    Discharge Vitals:    /66   Pulse 71   Temp 36.7 °C (98 °F) (Temporal)   Resp 16   Ht 1.676 m (5' 6\")   Wt 112.9 kg (248 lb 14.4 oz)   SpO2 97%   BMI 40.17 kg/m²        FINAL IMPRESSION:  1. Acute cystitis without hematuria         The patient will return for new or worsening symptoms and is stable at the time of discharge.      DISPOSITION:  Patient will be discharged home in stable condition.    FOLLOW UP:  [facility], Emergency Dept  [address] [facility] [address] [phone]    If symptoms worsen    Adelina Palomares, PGABI.  [address] [id number] [phone]    In 2 days        OUTPATIENT MEDICATIONS:  Discharge Medication List as of 10/2/2019  8:08 PM      START taking these medications    Details   cephALEXin (KEFLEX) 500 MG Cap Take 1 Cap by mouth 3 times a day for 7 days., Disp-21 Cap, R-0, Print Rx Paper               Elias PATTERSON (Scribe), am scribing for, and in the presence of, Darrion Hanley D.O..    Electronically signed by: Elias Boles (Scribe), 10/2/2019    Darrion PATTERSON D.O. personally performed the services described in this documentation, as scribed by Elias Boles in my presence, and it is both accurate and " complete.    E    Please note that this dictation was created using voice recognition software. I have worked with consultants from the vendor as well as technical experts from [facility] to optimize the interface. I have made every reasonable attempt to correct obvious errors, but I expect that there are errors of grammar and possibly content that I did not discover before finalizing the note.

## 2019-10-03 NOTE — ED NOTES
Pt given discharge and follow up instructions and prescriptions, all questions answered, pt verbalized understanding. Pt discharged to self. Copy of discharge provided to pt. Signed copy in chart.  Pt states that all personal belongings are in possession. Pt off unit w/ steady gait.

## 2019-10-04 LAB
BACTERIA UR CULT: ABNORMAL
BACTERIA UR CULT: ABNORMAL
SIGNIFICANT IND 70042: ABNORMAL
SITE SITE: ABNORMAL
SOURCE SOURCE: ABNORMAL

## 2019-10-06 NOTE — ED NOTES
"ED Positive Culture Follow-up/Notification Note:    Date: 10/6/19     Patient seen in the ED on 10/2/2019 for dysuria.     1. Acute cystitis without hematuria       Discharge Medication List as of 10/2/2019  8:08 PM      START taking these medications    Details   cephALEXin (KEFLEX) 500 MG Cap Take 1 Cap by mouth 3 times a day for 7 days., Disp-21 Cap, R-0, Print Rx Paper             Allergies: Oxycodone     Vitals:    10/02/19 1808 10/02/19 1811 10/02/19 2013   BP: 128/77  104/66   Pulse: 89  71   Resp: 16  16   Temp: 36.8 °C (98.2 °F)  36.7 °C (98 °F)   TempSrc: Temporal  Temporal   SpO2: 98%  97%   Weight:  112.9 kg (248 lb 14.4 oz)    Height: 1.676 m (5' 6\") 1.676 m (5' 6\")        Final cultures:   Results     Procedure Component Value Units Date/Time    URINE CULTURE(NEW) [[id number]]  (Abnormal)  (Susceptibility) Collected:  10/02/19 1825    Order Status:  Completed Specimen:  Urine Updated:  10/04/19 0925     Significant Indicator POS     Source UR     Site -     Culture Result Mixed skin joselin 10-50,000 cfu/mL      Escherichia coli  ,000 cfu/mL      Susceptibility     Escherichia coli (1)     Antibiotic Interpretation Microscan Method Status    Ampicillin Resistant >16 mcg/mL EMIL Final    Ceftriaxone Sensitive <=1 mcg/mL EMIL Final    Ceftazidime Sensitive <=1 mcg/mL EMIL Final    Cefotaxime Sensitive <=2 mcg/mL EMIL Final    Cefazolin Sensitive 8 mcg/mL EMIL Final    Ciprofloxacin Sensitive <=1 mcg/mL EMIL Final    Ampicillin/sulbactam Resistant >16/8 mcg/mL EMIL Final    Cefepime Sensitive <=2 mcg/mL EMIL Final    Tobramycin Sensitive <=4 mcg/mL EMIL Final    Cefotetan Sensitive <=16 mcg/mL EMIL Final    Nitrofurantoin Sensitive <=32 mcg/mL EMIL Final    Gentamicin Sensitive <=4 mcg/mL EMIL Final    Levofloxacin Sensitive <=2 mcg/mL EMIL Final    Pip/Tazobactam Sensitive <=16 mcg/mL EMIL Final    Trimeth/Sulfa Resistant >2/38 mcg/mL EMIL Final                   URINALYSIS CULTURE, IF INDICATED [[id number]]  " (Abnormal) Collected:  10/02/19 1825    Order Status:  Completed Specimen:  Urine, Clean Catch Updated:  10/02/19 1853     Color Yellow     Character Clear     Specific Gravity 1.021     Ph 5.0     Glucose Negative mg/dL      Ketones Negative mg/dL      Protein Negative mg/dL      Bilirubin Negative     Urobilinogen, Urine 0.2     Nitrite Negative     Leukocyte Esterase Moderate     Occult Blood Negative     Micro Urine Req Microscopic          Plan:   Appropriate antibiotic therapy prescribed. No changes required based upon culture result.  Sent letter to patient to notify of positive culture result and encourage compliance with prescribed antibiotics.     Laura Dunham, PharmD

## 2021-01-04 ENCOUNTER — HOSPITAL ENCOUNTER (OUTPATIENT)
Dept: LAB | Facility: MEDICAL CENTER | Age: 31
End: 2021-01-04
Attending: ANESTHESIOLOGY
Payer: MEDICAID

## 2021-01-04 LAB
COVID ORDER STATUS COVID19: NORMAL
SARS-COV-2 RNA RESP QL NAA+PROBE: NOTDETECTED
SPECIMEN SOURCE: NORMAL

## 2021-01-04 PROCEDURE — U0003 INFECTIOUS AGENT DETECTION BY NUCLEIC ACID (DNA OR RNA); SEVERE ACUTE RESPIRATORY SYNDROME CORONAVIRUS 2 (SARS-COV-2) (CORONAVIRUS DISEASE [COVID-19]), AMPLIFIED PROBE TECHNIQUE, MAKING USE OF HIGH THROUGHPUT TECHNOLOGIES AS DESCRIBED BY CMS-2020-01-R: HCPCS

## 2021-01-04 PROCEDURE — C9803 HOPD COVID-19 SPEC COLLECT: HCPCS

## 2021-01-04 PROCEDURE — U0005 INFEC AGEN DETEC AMPLI PROBE: HCPCS

## 2022-04-04 ENCOUNTER — OFFICE VISIT (OUTPATIENT)
Dept: URGENT CARE | Facility: PHYSICIAN GROUP | Age: 32
End: 2022-04-04
Payer: COMMERCIAL

## 2022-04-04 VITALS
SYSTOLIC BLOOD PRESSURE: 120 MMHG | OXYGEN SATURATION: 96 % | DIASTOLIC BLOOD PRESSURE: 80 MMHG | HEIGHT: 66 IN | TEMPERATURE: 97.3 F | RESPIRATION RATE: 16 BRPM | BODY MASS INDEX: 35.36 KG/M2 | WEIGHT: 220 LBS | HEART RATE: 86 BPM

## 2022-04-04 DIAGNOSIS — J01.90 ACUTE BACTERIAL RHINOSINUSITIS: ICD-10-CM

## 2022-04-04 DIAGNOSIS — B37.9 ANTIBIOTIC-INDUCED YEAST INFECTION: ICD-10-CM

## 2022-04-04 DIAGNOSIS — B96.89 ACUTE BACTERIAL RHINOSINUSITIS: ICD-10-CM

## 2022-04-04 DIAGNOSIS — T36.95XA ANTIBIOTIC-INDUCED YEAST INFECTION: ICD-10-CM

## 2022-04-04 PROCEDURE — 99203 OFFICE O/P NEW LOW 30 MIN: CPT | Performed by: STUDENT IN AN ORGANIZED HEALTH CARE EDUCATION/TRAINING PROGRAM

## 2022-04-04 RX ORDER — CEFDINIR 300 MG/1
300 CAPSULE ORAL 2 TIMES DAILY
Qty: 10 CAPSULE | Refills: 0 | Status: SHIPPED | OUTPATIENT
Start: 2022-04-04 | End: 2022-04-09

## 2022-04-04 RX ORDER — FLUCONAZOLE 150 MG/1
TABLET ORAL
Qty: 2 TABLET | Refills: 0 | Status: SHIPPED | OUTPATIENT
Start: 2022-04-04 | End: 2023-06-02

## 2022-04-04 NOTE — PROGRESS NOTES
Subjective:   CHIEF COMPLAINT  Chief Complaint   Patient presents with   • Ear Pain     X1wk, Bilateral Ear pain, plugged, congestion, headache       HPI  Eneida San is a 31 y.o. female who presents with a chief complaint of bilateral ear congestion, left sinus pain and pressure associated with headache.  Symptoms started 4 days ago.  She is tried Tylenol and allergy medications which have not helped.  Positive ROS for shortness of breath and cough secondary to PND.  No wheezing.  Patient is vaccine against COVID x2.  No booster.    REVIEW OF SYSTEMS  General: no fever or chills  GI: no nausea or vomiting  See HPI for further details.    PAST MEDICAL HISTORY  Patient Active Problem List    Diagnosis Date Noted   • Pregnancy 12/21/2017       SURGICAL HISTORY   has a past surgical history that includes vaginal vault enterocele laparoscopy (2008); appendectomy laparoscopic (2/18/2012); and lucia by laparoscopy (2/12/2014).    ALLERGIES  Allergies   Allergen Reactions   • Oxycodone Palpitations   • Pineapple    • Amoxicillin Rash       CURRENT MEDICATIONS  Home Medications     Reviewed by Fritz Saba Ass't (Medical Assistant) on 04/04/22 at 1507  Med List Status: <None>   Medication Last Dose Status   Acetaminophen (TYLENOL PO) Taking Active   docusate sodium 100 MG Cap Not Taking Active   ferrous sulfate 325 (65 Fe) MG tablet Not Taking Active   ibuprofen (MOTRIN) 800 MG Tab Not Taking Active   ondansetron (ZOFRAN ODT) 4 MG TBDP Not Taking Active   oxycodone-acetaminophen (PERCOCET) 5-325 MG TABS Not Taking Active   Prenatal Vit-Fe Fumarate-FA (PRENATAL PO) Not Taking Active   Pseudoephedrine-APAP-DM (DAYQUIL PO) Taking Active                SOCIAL HISTORY  Social History     Tobacco Use   • Smoking status: Never Smoker   • Smokeless tobacco: Never Used   Vaping Use   • Vaping Use: Some days   Substance and Sexual Activity   • Alcohol use: No     Comment: 3 per month   • Drug use: No   •  "Sexual activity: Not Currently     Partners: Male       FAMILY HISTORY  Family History   Problem Relation Age of Onset   • Diabetes Maternal Grandmother    • Diabetes Maternal Grandfather    • Diabetes Paternal Grandmother         had bilat BKA   • Stroke Other           Objective:   PHYSICAL EXAM  VITAL SIGNS: /80 (BP Location: Right arm, Patient Position: Sitting, BP Cuff Size: Large adult)   Pulse 86   Temp 36.3 °C (97.3 °F) (Temporal)   Resp 16   Ht 1.676 m (5' 6\")   Wt 99.8 kg (220 lb)   SpO2 96%   BMI 35.51 kg/m²     Gen: no acute distress, normal voice  Skin: dry, intact, moist mucosal membranes  ENT: Serous effusion behind bilateral TMs without bulging or erythema.  Lungs: CTAB w/ symmetric expansion  CV: RRR w/o murmurs or clicks  Psych: normal affect, normal judgement, alert, awake    Assessment/Plan:     1. Acute bacterial rhinosinusitis  cefdinir (OMNICEF) 300 MG Cap   2. Antibiotic-induced yeast infection  fluconazole (DIFLUCAN) 150 MG tablet   Signs/symptoms consistent with a bacterial sinus infection.  On examination there was serous effusion behind bilateral TMs likely precipitated by her sinus infection.  Records report history of PCN allergies; says her skin gets itchy.  She is uncertain if she has been on cephalosporins in the past.  Discussed low risk of cross-reactivity.  -Ordered Rx for Omnicef  -Ordered Rx for Diflucan if develop yeast infection   -Return to urgent care any new/worsening symptoms or further questions or concerns.  Patient understood everything discussed.  All questions were answered.    differential diagnosis, natural history, supportive care, and indications for immediate follow-up discussed. All questions answered. Patient agrees with the plan of care.    Follow-up as needed if symptoms worsen or fail to improve to PCP, Urgent care or Emergency Room.    Please note that this dictation was created using voice recognition software. I have made a reasonable attempt " to correct obvious errors, but I expect that there are errors of grammar and possibly content that I did not discover before finalizing the note.

## 2022-04-04 NOTE — LETTER
April 4, 2022       Patient: Eneida San   YOB: 1990   Date of Visit: 4/4/2022         To Whom It May Concern:    Eneida San was seen in urgent care for her doctor's appointment on 4/4/22.     If you have any questions or concerns, please don't hesitate to call [phone]          Sincerely,          Primo Crisostomo D.O.  Electronically Signed

## 2022-08-17 PROCEDURE — 99284 EMERGENCY DEPT VISIT MOD MDM: CPT

## 2022-08-18 ENCOUNTER — HOSPITAL ENCOUNTER (EMERGENCY)
Facility: MEDICAL CENTER | Age: 32
End: 2022-08-18
Attending: STUDENT IN AN ORGANIZED HEALTH CARE EDUCATION/TRAINING PROGRAM
Payer: COMMERCIAL

## 2022-08-18 ENCOUNTER — APPOINTMENT (OUTPATIENT)
Dept: RADIOLOGY | Facility: MEDICAL CENTER | Age: 32
End: 2022-08-18
Attending: STUDENT IN AN ORGANIZED HEALTH CARE EDUCATION/TRAINING PROGRAM
Payer: COMMERCIAL

## 2022-08-18 VITALS
OXYGEN SATURATION: 100 % | DIASTOLIC BLOOD PRESSURE: 66 MMHG | TEMPERATURE: 98.3 F | BODY MASS INDEX: 36.07 KG/M2 | HEART RATE: 56 BPM | HEIGHT: 66 IN | RESPIRATION RATE: 14 BRPM | SYSTOLIC BLOOD PRESSURE: 143 MMHG | WEIGHT: 224.43 LBS

## 2022-08-18 DIAGNOSIS — N94.9 VAGINAL LUMP: ICD-10-CM

## 2022-08-18 DIAGNOSIS — R10.9 FLANK PAIN: ICD-10-CM

## 2022-08-18 LAB
ALBUMIN SERPL BCP-MCNC: 4.7 G/DL (ref 3.2–4.9)
ALBUMIN/GLOB SERPL: 1.7 G/DL
ALP SERPL-CCNC: 62 U/L (ref 30–99)
ALT SERPL-CCNC: 11 U/L (ref 2–50)
ANION GAP SERPL CALC-SCNC: 11 MMOL/L (ref 7–16)
APPEARANCE UR: CLEAR
AST SERPL-CCNC: 12 U/L (ref 12–45)
BASOPHILS # BLD AUTO: 1 % (ref 0–1.8)
BASOPHILS # BLD: 0.09 K/UL (ref 0–0.12)
BILIRUB SERPL-MCNC: 0.2 MG/DL (ref 0.1–1.5)
BILIRUB UR QL STRIP.AUTO: NEGATIVE
BUN SERPL-MCNC: 11 MG/DL (ref 8–22)
CALCIUM SERPL-MCNC: 9.3 MG/DL (ref 8.5–10.5)
CHLORIDE SERPL-SCNC: 106 MMOL/L (ref 96–112)
CO2 SERPL-SCNC: 22 MMOL/L (ref 20–33)
COLOR UR: YELLOW
CREAT SERPL-MCNC: 0.53 MG/DL (ref 0.5–1.4)
EOSINOPHIL # BLD AUTO: 0.17 K/UL (ref 0–0.51)
EOSINOPHIL NFR BLD: 1.9 % (ref 0–6.9)
ERYTHROCYTE [DISTWIDTH] IN BLOOD BY AUTOMATED COUNT: 39.9 FL (ref 35.9–50)
GFR SERPLBLD CREATININE-BSD FMLA CKD-EPI: 126 ML/MIN/1.73 M 2
GLOBULIN SER CALC-MCNC: 2.8 G/DL (ref 1.9–3.5)
GLUCOSE SERPL-MCNC: 87 MG/DL (ref 65–99)
GLUCOSE UR STRIP.AUTO-MCNC: NEGATIVE MG/DL
HCG UR QL: NEGATIVE
HCT VFR BLD AUTO: 42.6 % (ref 37–47)
HGB BLD-MCNC: 13.4 G/DL (ref 12–16)
IMM GRANULOCYTES # BLD AUTO: 0.02 K/UL (ref 0–0.11)
IMM GRANULOCYTES NFR BLD AUTO: 0.2 % (ref 0–0.9)
KETONES UR STRIP.AUTO-MCNC: NEGATIVE MG/DL
LEUKOCYTE ESTERASE UR QL STRIP.AUTO: NEGATIVE
LIPASE SERPL-CCNC: 22 U/L (ref 11–82)
LYMPHOCYTES # BLD AUTO: 3.13 K/UL (ref 1–4.8)
LYMPHOCYTES NFR BLD: 35.8 % (ref 22–41)
MCH RBC QN AUTO: 26.1 PG (ref 27–33)
MCHC RBC AUTO-ENTMCNC: 31.5 G/DL (ref 33.6–35)
MCV RBC AUTO: 83 FL (ref 81.4–97.8)
MICRO URNS: NORMAL
MONOCYTES # BLD AUTO: 0.51 K/UL (ref 0–0.85)
MONOCYTES NFR BLD AUTO: 5.8 % (ref 0–13.4)
NEUTROPHILS # BLD AUTO: 4.83 K/UL (ref 2–7.15)
NEUTROPHILS NFR BLD: 55.3 % (ref 44–72)
NITRITE UR QL STRIP.AUTO: NEGATIVE
NRBC # BLD AUTO: 0 K/UL
NRBC BLD-RTO: 0 /100 WBC
PH UR STRIP.AUTO: 6 [PH] (ref 5–8)
PLATELET # BLD AUTO: 415 K/UL (ref 164–446)
PMV BLD AUTO: 11.1 FL (ref 9–12.9)
POTASSIUM SERPL-SCNC: 3.6 MMOL/L (ref 3.6–5.5)
PROT SERPL-MCNC: 7.5 G/DL (ref 6–8.2)
PROT UR QL STRIP: NEGATIVE MG/DL
RBC # BLD AUTO: 5.13 M/UL (ref 4.2–5.4)
RBC UR QL AUTO: NEGATIVE
SODIUM SERPL-SCNC: 139 MMOL/L (ref 135–145)
SP GR UR STRIP.AUTO: 1.02
SPECIMEN SOURCE: NORMAL
UROBILINOGEN UR STRIP.AUTO-MCNC: 0.2 MG/DL
WBC # BLD AUTO: 8.8 K/UL (ref 4.8–10.8)

## 2022-08-18 PROCEDURE — 36415 COLL VENOUS BLD VENIPUNCTURE: CPT

## 2022-08-18 PROCEDURE — 80053 COMPREHEN METABOLIC PANEL: CPT

## 2022-08-18 PROCEDURE — 81025 URINE PREGNANCY TEST: CPT

## 2022-08-18 PROCEDURE — 96374 THER/PROPH/DIAG INJ IV PUSH: CPT

## 2022-08-18 PROCEDURE — 81003 URINALYSIS AUTO W/O SCOPE: CPT

## 2022-08-18 PROCEDURE — 74176 CT ABD & PELVIS W/O CONTRAST: CPT

## 2022-08-18 PROCEDURE — 96375 TX/PRO/DX INJ NEW DRUG ADDON: CPT

## 2022-08-18 PROCEDURE — 83690 ASSAY OF LIPASE: CPT

## 2022-08-18 PROCEDURE — 85025 COMPLETE CBC W/AUTO DIFF WBC: CPT

## 2022-08-18 PROCEDURE — 700111 HCHG RX REV CODE 636 W/ 250 OVERRIDE (IP): Performed by: STUDENT IN AN ORGANIZED HEALTH CARE EDUCATION/TRAINING PROGRAM

## 2022-08-18 RX ORDER — KETOROLAC TROMETHAMINE 30 MG/ML
15 INJECTION, SOLUTION INTRAMUSCULAR; INTRAVENOUS ONCE
Status: COMPLETED | OUTPATIENT
Start: 2022-08-18 | End: 2022-08-18

## 2022-08-18 RX ORDER — ONDANSETRON 2 MG/ML
4 INJECTION INTRAMUSCULAR; INTRAVENOUS ONCE
Status: COMPLETED | OUTPATIENT
Start: 2022-08-18 | End: 2022-08-18

## 2022-08-18 RX ADMIN — ONDANSETRON 4 MG: 2 INJECTION INTRAMUSCULAR; INTRAVENOUS at 04:08

## 2022-08-18 RX ADMIN — KETOROLAC TROMETHAMINE 15 MG: 30 INJECTION, SOLUTION INTRAMUSCULAR at 06:17

## 2022-08-18 NOTE — ED PROVIDER NOTES
"CHIEF COMPLAINT  Chief Complaint   Patient presents with    Lump     R groin and on labia per patient. Noticed 4 days ago. Concerned they are growing in size.    N/V     Onset 4 days ago.    Flank Pain     R side radiating down to the lump. Reports malodorous urine. 8/10 throbbing pain.        HPI  Eneida Dao is a 31 y.o. female who presents to the Emergency Department for evaluation of right sided flank pain onset 4 days ago. The patient states that she suddenly began experiencing constant right sided flank pain approximately 4 days ago without any specific trigger. She describes the flank pain as a \"sharp\" and currently rates it a 7/10. It radiates to her groin.  No aggravating factors.  Additionally, the patient reports developing 'lumps' to her labia in conjunction with the onset of the flank pain. Associated symptoms include left sided chest pain, increased fatigue, nausea, chills.  Denies burning pain or itching.  The patient denies any fever, hematuria, dysuria, abnormal vaginal discharge, bloody vaginal discharge, chance of pregnancy, recent surgery, or recent travel. She utilizes Nexplanon for birth control and was last sexually active 1-2 months ago. She denies concern for STD.  Patient has a history of prior kidney infection but denies any history of diabetes, hypertension, blood clots, or kidney stones. Her last menstrual period was on 08/06/2022. Patient has a surgical history of appendectomy and cholecystectomy.     REVIEW OF SYSTEMS  As per HPI, otherwise a 10 point review of systems is negative    PAST MEDICAL HISTORY  Past Medical History:   Diagnosis Date    Breath shortness 2/11/14    prescribed inhaled, has not used inhaler past 4-5months    Fetal abnormality in pregnancy 8/31/2010    Heart burn     Indigestion        SOCIAL HISTORY  Social History     Tobacco Use    Smoking status: Never    Smokeless tobacco: Never   Vaping Use    Vaping Use: Some days   Substance Use Topics    " "Alcohol use: No     Comment: 3 per month    Drug use: No       SURGICAL HISTORY  Past Surgical History:   Procedure Laterality Date    TERI BY LAPAROSCOPY  2/12/2014    Performed by Primo Glass M.D. at SURGERY [facility]    APPENDECTOMY LAPAROSCOPIC  2/18/2012    Performed by DAMEON ARAUZ at SURGERY [facility]    VAGINAL VAULT ENTEROCELE LAPAROSCOPY  2008    vaginal recon       CURRENT MEDICATIONS  Home Medications       Reviewed by Enriqueta Altamirano R.N. (Registered Nurse) on 08/17/22 at 2347  Med List Status: Partial     Medication Last Dose Status   Acetaminophen (TYLENOL PO)  Active   docusate sodium 100 MG Cap  Active   ferrous sulfate 325 (65 Fe) MG tablet  Active   fluconazole (DIFLUCAN) 150 MG tablet Not Taking Active   ibuprofen (MOTRIN) 800 MG Tab  Active   ondansetron (ZOFRAN ODT) 4 MG TBDP  Active   oxycodone-acetaminophen (PERCOCET) 5-325 MG TABS  Active   Prenatal Vit-Fe Fumarate-FA (PRENATAL PO)  Active   Pseudoephedrine-APAP-DM (DAYQUIL PO) Not Taking Active                    ALLERGIES  Allergies   Allergen Reactions    Oxycodone Palpitations    Pineapple     Amoxicillin Rash       PHYSICAL EXAM  VITAL SIGNS: BP (!) 149/74   Pulse 67   Temp 36.8 °C (98.3 °F) (Temporal)   Resp 14   Ht 1.676 m (5' 6\")   Wt 102 kg (224 lb 6.9 oz)   SpO2 97%   BMI 36.22 kg/m²    Constitutional: Awake and alert . No distress  HENT: Normal inspection  Eyes: Normal inspection  Neck: Grossly normal range of motion.  Cardiovascular: Normal heart rate, Normal rhythm.  Symmetric peripheral pulses.   Thorax & Lungs: No respiratory distress, No wheezing, No rales, No rhonchi,   Abdomen: Soft, non-distended, nontender, no mass  : 1 cm palpable nodule to the right labia majora without vesicular lesions or ulcerations, mild tenderness to palpation, no purulence, no erythema no palpable lymphadenopathy.  Skin: No obvious rash.  Back: No tenderness, Mild right CVA tenderness.   Extremities: Warm, well " perfused. No clubbing, cyanosis, edema,   Neurologic: Grossly normal   Psychiatric: Normal for situation    Labs:  Results for orders placed or performed during the hospital encounter of 08/18/22   CBC WITH DIFFERENTIAL   Result Value Ref Range    WBC 8.8 4.8 - 10.8 K/uL    RBC 5.13 4.20 - 5.40 M/uL    Hemoglobin 13.4 12.0 - 16.0 g/dL    Hematocrit 42.6 37.0 - 47.0 %    MCV 83.0 81.4 - 97.8 fL    MCH 26.1 (L) 27.0 - 33.0 pg    MCHC 31.5 (L) 33.6 - 35.0 g/dL    RDW 39.9 35.9 - 50.0 fL    Platelet Count 415 164 - 446 K/uL    MPV 11.1 9.0 - 12.9 fL    Neutrophils-Polys 55.30 44.00 - 72.00 %    Lymphocytes 35.80 22.00 - 41.00 %    Monocytes 5.80 0.00 - 13.40 %    Eosinophils 1.90 0.00 - 6.90 %    Basophils 1.00 0.00 - 1.80 %    Immature Granulocytes 0.20 0.00 - 0.90 %    Nucleated RBC 0.00 /100 WBC    Neutrophils (Absolute) 4.83 2.00 - 7.15 K/uL    Lymphs (Absolute) 3.13 1.00 - 4.80 K/uL    Monos (Absolute) 0.51 0.00 - 0.85 K/uL    Eos (Absolute) 0.17 0.00 - 0.51 K/uL    Baso (Absolute) 0.09 0.00 - 0.12 K/uL    Immature Granulocytes (abs) 0.02 0.00 - 0.11 K/uL    NRBC (Absolute) 0.00 K/uL   COMP METABOLIC PANEL   Result Value Ref Range    Sodium 139 135 - 145 mmol/L    Potassium 3.6 3.6 - 5.5 mmol/L    Chloride 106 96 - 112 mmol/L    Co2 22 20 - 33 mmol/L    Anion Gap 11.0 7.0 - 16.0    Glucose 87 65 - 99 mg/dL    Bun 11 8 - 22 mg/dL    Creatinine 0.53 0.50 - 1.40 mg/dL    Calcium 9.3 8.5 - 10.5 mg/dL    AST(SGOT) 12 12 - 45 U/L    ALT(SGPT) 11 2 - 50 U/L    Alkaline Phosphatase 62 30 - 99 U/L    Total Bilirubin 0.2 0.1 - 1.5 mg/dL    Albumin 4.7 3.2 - 4.9 g/dL    Total Protein 7.5 6.0 - 8.2 g/dL    Globulin 2.8 1.9 - 3.5 g/dL    A-G Ratio 1.7 g/dL   LIPASE   Result Value Ref Range    Lipase 22 11 - 82 U/L   URINALYSIS    Specimen: Urine   Result Value Ref Range    Color Yellow     Character Clear     Specific Gravity 1.022 <1.035    Ph 6.0 5.0 - 8.0    Glucose Negative Negative mg/dL    Ketones Negative Negative mg/dL     Protein Negative Negative mg/dL    Bilirubin Negative Negative    Urobilinogen, Urine 0.2 Negative    Nitrite Negative Negative    Leukocyte Esterase Negative Negative    Occult Blood Negative Negative    Micro Urine Req see below    BETA-HCG QUALITATIVE URINE   Result Value Ref Range    Beta-Hcg Urine Negative Negative   Chlamydia/GC, PCR (Urine)    Specimen: Urine   Result Value Ref Range    Source Urine    ESTIMATED GFR   Result Value Ref Range    GFR (CKD-EPI) 126 >60 mL/min/1.73 m 2       Medications   ondansetron (ZOFRAN) syringe/vial injection 4 mg (4 mg Intravenous Given 8/18/22 0408)   ketorolac (TORADOL) injection 15 mg (15 mg Intravenous Given 8/18/22 0617)         COURSE & MEDICAL DECISION MAKING    2:56 AM - Patient was evaluated at bedside. Ordered UA, Lipase. CMP, CBC with diff, Beta-HCG Qualitative Urine, and US-Renal. Discussed utilizing labs and imaging to further evaluate the patient, she is amenable to the plan of care.     This is a healthy 31-year-old who presents with right-sided flank pain and new lump noted to her labia (right).  She arrives with normal vital signs and appears overall well, not in distress.  She does not have any abdominal tenderness to suggest appendicitis, cholecystitis, small bowel obstruction or other acute intra-abdominal process. She has no lower pelvic pain to suggest pelvic inflammatory disease or ovarian torsion.    Her labs are reassuring without leukocytosis or kidney injury.  Urine without signs of infection and pregnancy negative.    CT obtained without evidence of nephrolithiasis, pyelonephritis or other etiologies to explain her flank pain.  An external exam was performed which is not consistent with herpes, monkeypox or other sexually transmitted infection.  It is possible she has a folliculitis.  No evidence to suggest abscess or Bartholin gland cyst.  I suggested that she follow-up closely with her primary doctor to ensure her symptoms are improving and  she is agreeable.  She was given one dose of Toradol in the ER.  She continued to look well, nonseptic appearing and feel safe discharging her at this time.    I advised her to follow-up with her PCP regarding her blood pressure and other health screening measures.    FINAL IMPRESSION  1. Flank pain Acute       2. Vaginal lump Acute            Valentin PATTERSON (Scribe), am scribing for, and in the presence of, Tala Hunter M.D..    Electronically signed by: Valentin Lopez (Scribe), 8/18/2022    ITala M.D. personally performed the services described in this documentation, as scribed by Valentin Lopez in my presence, and it is both accurate and complete.    This dictation was created using voice recognition software. The accuracy of the dictation is limited to the abilities of the software.  The nursing notes were reviewed and certain aspects of this information were incorporated into this note.    The note accurately reflects work and decisions made by me.  Tala Hunter M.D.  8/18/2022  7:11 AM

## 2022-08-18 NOTE — ED TRIAGE NOTES
Eneida Dao  31 y.o. female  Chief Complaint   Patient presents with    Lump     R groin and on labia per patient. Noticed 4 days ago. Concerned they are growing in size.    N/V     Onset 4 days ago.    Flank Pain     R side radiating down to the lump. Reports malodorous urine. 8/10 throbbing pain.      Pt ambulatory with steady gait to triage for above complaint.     Pt is alert, oriented, and follows commands. Pt speaking in full sentences and responds appropriately to questions. No acute distress noted in triage and respirations are even and unlabored.     Pt placed in lobby and educated on triage process. Pt encouraged to alert staff for any changes in condition.

## 2022-08-18 NOTE — DISCHARGE INSTRUCTIONS
Work-up in the emergency department, normal labs and normal imaging.  It is unclear exactly why you are having the pain and it is possibly muscular in origin.  Please follow-up with your primary doctor regarding the lumps and return if you develop any worsening pain, swelling, discharge, odor or any new concern.

## 2022-08-18 NOTE — ED NOTES
Patient ambulatory to [facility]  for triage complaint. Pt placed in gown and placed on monitor.

## 2023-06-02 ENCOUNTER — OFFICE VISIT (OUTPATIENT)
Dept: URGENT CARE | Facility: PHYSICIAN GROUP | Age: 33
End: 2023-06-02
Payer: COMMERCIAL

## 2023-06-02 ENCOUNTER — HOSPITAL ENCOUNTER (OUTPATIENT)
Facility: MEDICAL CENTER | Age: 33
End: 2023-06-02
Attending: FAMILY MEDICINE
Payer: COMMERCIAL

## 2023-06-02 VITALS
WEIGHT: 224 LBS | DIASTOLIC BLOOD PRESSURE: 70 MMHG | TEMPERATURE: 97.4 F | HEART RATE: 57 BPM | BODY MASS INDEX: 36 KG/M2 | SYSTOLIC BLOOD PRESSURE: 110 MMHG | RESPIRATION RATE: 16 BRPM | OXYGEN SATURATION: 100 % | HEIGHT: 66 IN

## 2023-06-02 DIAGNOSIS — R30.0 DYSURIA: ICD-10-CM

## 2023-06-02 DIAGNOSIS — R11.0 NAUSEA: ICD-10-CM

## 2023-06-02 DIAGNOSIS — R82.90 ABNORMAL URINALYSIS: ICD-10-CM

## 2023-06-02 DIAGNOSIS — A49.8 GARDNERELLA INFECTION: ICD-10-CM

## 2023-06-02 DIAGNOSIS — R19.7 DIARRHEA, UNSPECIFIED TYPE: ICD-10-CM

## 2023-06-02 LAB
APPEARANCE UR: NORMAL
BILIRUB UR STRIP-MCNC: NORMAL MG/DL
COLOR UR AUTO: NORMAL
GLUCOSE UR STRIP.AUTO-MCNC: NEGATIVE MG/DL
KETONES UR STRIP.AUTO-MCNC: NEGATIVE MG/DL
LEUKOCYTE ESTERASE UR QL STRIP.AUTO: NORMAL
NITRITE UR QL STRIP.AUTO: NEGATIVE
PH UR STRIP.AUTO: 5.5 [PH] (ref 5–8)
POCT INT CON NEG: NEGATIVE
POCT INT CON POS: POSITIVE
POCT URINE PREGNANCY TEST: NEGATIVE
PROT UR QL STRIP: NORMAL MG/DL
RBC UR QL AUTO: NORMAL
SP GR UR STRIP.AUTO: 1.03
UROBILINOGEN UR STRIP-MCNC: 0.2 MG/DL

## 2023-06-02 PROCEDURE — 3078F DIAST BP <80 MM HG: CPT | Performed by: FAMILY MEDICINE

## 2023-06-02 PROCEDURE — 81025 URINE PREGNANCY TEST: CPT | Performed by: FAMILY MEDICINE

## 2023-06-02 PROCEDURE — 87077 CULTURE AEROBIC IDENTIFY: CPT

## 2023-06-02 PROCEDURE — 81002 URINALYSIS NONAUTO W/O SCOPE: CPT | Performed by: FAMILY MEDICINE

## 2023-06-02 PROCEDURE — 87086 URINE CULTURE/COLONY COUNT: CPT

## 2023-06-02 PROCEDURE — 99213 OFFICE O/P EST LOW 20 MIN: CPT | Performed by: FAMILY MEDICINE

## 2023-06-02 PROCEDURE — 3074F SYST BP LT 130 MM HG: CPT | Performed by: FAMILY MEDICINE

## 2023-06-02 RX ORDER — ONDANSETRON 4 MG/1
4 TABLET, ORALLY DISINTEGRATING ORAL EVERY 8 HOURS PRN
Qty: 6 TABLET | Refills: 0 | Status: SHIPPED | OUTPATIENT
Start: 2023-06-02

## 2023-06-02 RX ORDER — NITROFURANTOIN 25; 75 MG/1; MG/1
100 CAPSULE ORAL 2 TIMES DAILY
Qty: 10 CAPSULE | Refills: 0 | Status: SHIPPED | OUTPATIENT
Start: 2023-06-02 | End: 2023-06-05

## 2023-06-02 ASSESSMENT — FIBROSIS 4 INDEX: FIB4 SCORE: 0.28

## 2023-06-02 NOTE — LETTER
June 2, 2023         Patient: Eneida Dao   YOB: 1990   Date of Visit: 6/2/2023           To Whom it May Concern:    Eneida Dao was seen in my clinic on 6/2/2023. Please excuse from work 6/2 and 6/3/2023. She may then return to her next scheduled shift to full duty if symptoms have resolved for 24 hours.     Sincerely,           Jacob Linares M.D.  Electronically Signed

## 2023-06-03 DIAGNOSIS — R30.0 DYSURIA: ICD-10-CM

## 2023-06-03 DIAGNOSIS — R82.90 ABNORMAL URINALYSIS: ICD-10-CM

## 2023-06-03 ASSESSMENT — ENCOUNTER SYMPTOMS
MYALGIAS: 0
WEIGHT LOSS: 0
EYE REDNESS: 0
EYE DISCHARGE: 0

## 2023-06-03 NOTE — PROGRESS NOTES
"Subjective     Eneida Dao is a 32 y.o. female who presents with Back Pain (X2 days pain when uriniting, diarrhea, and lower back pain)            2 days multiple episodes of watery diarrhea without blood in stool.  Nausea without emesis.  Generalized abdominal pain including pelvic region..  Low back pain.  Urinary burning urgency and frequency.  No hematuria.  No fever.  No recent foreign travel/camping/antibiotic use.  Try to push fluids with normal urine output.  She notes that a family member had a diarrhea illness that resolved spontaneously.  No other aggravating or alleviating factors.        Review of Systems   Constitutional:  Negative for malaise/fatigue and weight loss.   Eyes:  Negative for discharge and redness.   Musculoskeletal:  Negative for joint pain and myalgias.   Skin:  Negative for itching and rash.              Objective     /70   Pulse (!) 57   Temp 36.3 °C (97.4 °F) (Temporal)   Resp 16   Ht 1.676 m (5' 6\")   Wt 102 kg (224 lb)   SpO2 100%   BMI 36.15 kg/m²      Physical Exam  Constitutional:       General: She is not in acute distress.     Appearance: She is well-developed.   HENT:      Head: Normocephalic and atraumatic.   Eyes:      Conjunctiva/sclera: Conjunctivae normal.   Cardiovascular:      Rate and Rhythm: Normal rate and regular rhythm.      Heart sounds: Normal heart sounds. No murmur heard.  Pulmonary:      Effort: Pulmonary effort is normal.      Breath sounds: Normal breath sounds. No wheezing.   Abdominal:      General: Bowel sounds are normal.      Palpations: Abdomen is soft.      Tenderness: There is abdominal tenderness (mild generalized). There is no guarding.   Skin:     General: Skin is warm and dry.      Findings: No rash.   Neurological:      Mental Status: She is alert.                             Assessment & Plan      UA reviewed    1. Diarrhea, unspecified type        2. Dysuria  nitrofurantoin (MACROBID) 100 MG Cap    URINE " CULTURE(NEW)    POCT Urinalysis    POCT PREGNANCY      3. Abnormal urinalysis  nitrofurantoin (MACROBID) 100 MG Cap    URINE CULTURE(NEW)      4. Nausea  ondansetron (ZOFRAN ODT) 4 MG TABLET DISPERSIBLE        Differential diagnosis, natural history, supportive care, and indications for immediate follow-up were discussed.     I suspect symptoms are due to viral gastroenteritis however she does have specific cystitis symptoms.  Will initiate antibiotic and follow-up urine culture.

## 2023-06-05 RX ORDER — METRONIDAZOLE 500 MG/1
500 TABLET ORAL EVERY 12 HOURS
Qty: 14 TABLET | Refills: 0 | Status: SHIPPED | OUTPATIENT
Start: 2023-06-05 | End: 2023-06-12

## 2023-07-26 ENCOUNTER — OFFICE VISIT (OUTPATIENT)
Dept: URGENT CARE | Facility: PHYSICIAN GROUP | Age: 33
End: 2023-07-26
Payer: COMMERCIAL

## 2023-07-26 VITALS — BODY MASS INDEX: 35.57 KG/M2 | HEIGHT: 66 IN | WEIGHT: 221.3 LBS

## 2023-07-26 DIAGNOSIS — J02.9 PHARYNGITIS, UNSPECIFIED ETIOLOGY: ICD-10-CM

## 2023-07-26 DIAGNOSIS — R68.89 FLU-LIKE SYMPTOMS: ICD-10-CM

## 2023-07-26 LAB
FLUAV RNA SPEC QL NAA+PROBE: NEGATIVE
FLUBV RNA SPEC QL NAA+PROBE: NEGATIVE
RSV RNA SPEC QL NAA+PROBE: NEGATIVE
S PYO DNA SPEC NAA+PROBE: NOT DETECTED
SARS-COV-2 RNA RESP QL NAA+PROBE: POSITIVE

## 2023-07-26 PROCEDURE — 0241U POCT CEPHEID COV-2, FLU A/B, RSV - PCR: CPT | Performed by: NURSE PRACTITIONER

## 2023-07-26 PROCEDURE — 87651 STREP A DNA AMP PROBE: CPT | Performed by: NURSE PRACTITIONER

## 2023-07-26 PROCEDURE — 99213 OFFICE O/P EST LOW 20 MIN: CPT | Performed by: NURSE PRACTITIONER

## 2023-07-26 RX ORDER — ETONOGESTREL 68 MG/1
IMPLANT SUBCUTANEOUS
COMMUNITY

## 2023-07-26 ASSESSMENT — FIBROSIS 4 INDEX: FIB4 SCORE: 0.28

## 2023-07-26 ASSESSMENT — ENCOUNTER SYMPTOMS
NAUSEA: 1
HEADACHES: 1
FEVER: 1

## 2023-07-26 NOTE — LETTER
July 26, 2023    To Whom It May Concern:         This is confirmation that Eneida Reed Lassiter Feliberto attended her scheduled appointment with LIZETH Dong on 7/26/23. Please excuse her absence due to an acute illness. She may return to work on 7/31/2023 or sooner if better.          If you have any questions please do not hesitate to call me at the phone number listed below.    Sincerely,          LÓPEZ DongRAnjaliN.  [phone]

## 2023-07-27 NOTE — PROGRESS NOTES
Subjective:     Eneida Dao is a 32 y.o. female who presents for Fever, Nausea, Blister (Blister under tongue x3d. ), Headache, and Ear Fullness      Fever   Associated symptoms include headaches and nausea.   Nausea  Associated symptoms include a fever, headaches and nausea.   Blister  Associated symptoms include a fever, headaches and nausea.   Headache  Ear Fullness  Associated symptoms include a fever, headaches and nausea.     Pt presents for evaluation of a new problem. Eneida is a very pleasant 32-year-old female presents urgent care today with complaints of fever, nausea, headache, sore throat and fatigue x3 days.  Her child and mother have been ill with similar symptoms.  They are now feeling better.  She has been using over-the-counter Tylenol, ibuprofen and cough medication.    Review of Systems   Constitutional:  Positive for fever.   Gastrointestinal:  Positive for nausea.   Neurological:  Positive for headaches.       PMH:   Past Medical History:   Diagnosis Date    Breath shortness 2/11/14    prescribed inhaled, has not used inhaler past 4-5months    Fetal abnormality in pregnancy 8/31/2010    Heart burn     Indigestion      ALLERGIES:   Allergies   Allergen Reactions    Oxycodone Palpitations    Pineapple     Amoxicillin Rash     SURGHX:   Past Surgical History:   Procedure Laterality Date    TERI BY LAPAROSCOPY  2/12/2014    Performed by Primo Glass M.D. at SURGERY [facility]    APPENDECTOMY LAPAROSCOPIC  2/18/2012    Performed by DAMEON ARAUZ at SURGERY [facility]    VAGINAL VAULT ENTEROCELE LAPAROSCOPY  2008    vaginal recon     SOCHX:   Social History     Socioeconomic History    Marital status: Single   Tobacco Use    Smoking status: Never    Smokeless tobacco: Never   Vaping Use    Vaping Use: Former   Substance and Sexual Activity    Alcohol use: No     Comment: 3 per month    Drug use: No    Sexual activity: Not Currently     Partners: Male     FH:   Family  "History   Problem Relation Age of Onset    Diabetes Maternal Grandmother     Diabetes Maternal Grandfather     Diabetes Paternal Grandmother         had bilat BKA    Stroke Other          Objective:   BP (P) 122/64 (BP Location: Left arm, Patient Position: Sitting, BP Cuff Size: Adult long)   Pulse (P) 75   Temp (P) 37 °C (98.6 °F) (Temporal)   Ht 1.676 m (5' 6\")   Wt 100 kg (221 lb 4.8 oz)   SpO2 (P) 96%   BMI 35.72 kg/m²     Physical Exam  Vitals and nursing note reviewed.   Constitutional:       General: She is not in acute distress.     Appearance: Normal appearance. She is not ill-appearing.   HENT:      Head: Normocephalic and atraumatic.      Right Ear: Tympanic membrane, ear canal and external ear normal. There is no impacted cerumen.      Left Ear: Tympanic membrane, ear canal and external ear normal. There is no impacted cerumen.      Nose: No congestion or rhinorrhea.      Mouth/Throat:      Mouth: Mucous membranes are moist.   Eyes:      Extraocular Movements: Extraocular movements intact.      Pupils: Pupils are equal, round, and reactive to light.   Cardiovascular:      Rate and Rhythm: Normal rate and regular rhythm.      Pulses: Normal pulses.      Heart sounds: Normal heart sounds.   Pulmonary:      Effort: Pulmonary effort is normal. No respiratory distress.      Breath sounds: Normal breath sounds. No stridor. No wheezing, rhonchi or rales.   Chest:      Chest wall: No tenderness.   Abdominal:      General: Abdomen is flat. Bowel sounds are normal.      Palpations: Abdomen is soft.      Tenderness: There is no abdominal tenderness. There is no right CVA tenderness or left CVA tenderness.   Musculoskeletal:         General: Normal range of motion.      Cervical back: Normal range of motion and neck supple.   Skin:     General: Skin is warm and dry.      Capillary Refill: Capillary refill takes less than 2 seconds.   Neurological:      General: No focal deficit present.      Mental Status: She " is alert and oriented to person, place, and time. Mental status is at baseline.   Psychiatric:         Mood and Affect: Mood normal.         Behavior: Behavior normal.         Thought Content: Thought content normal.         Judgment: Judgment normal.       Results for orders placed or performed in visit on 07/26/23   POCT CEPHEID COV-2, FLU A/B, RSV - PCR   Result Value Ref Range    SARS-CoV-2 by PCR Positive (A) Negative, Invalid    Influenza virus A RNA Negative Negative, Invalid    Influenza virus B, PCR Negative Negative, Invalid    RSV, PCR Negative Negative, Invalid   POCT GROUP A STREP, PCR   Result Value Ref Range    POC Group A Strep, PCR Not Detected Not Detected, Invalid       Assessment/Plan:   Assessment    1. Flu-like symptoms  POCT CEPHEID COV-2, FLU A/B, RSV - PCR      2. Pharyngitis, unspecified etiology  POCT CEPHEID COV-2, FLU A/B, RSV - PCR    POCT GROUP A STREP, PCR        Patient did test positive for COVID.  Message was left with information of positive test result and isolation guidelines.  Continue with Tylenol, ibuprofen, rest and increase fluids.  Follow up with worsening symptoms.